# Patient Record
Sex: MALE | Race: WHITE | URBAN - METROPOLITAN AREA
[De-identification: names, ages, dates, MRNs, and addresses within clinical notes are randomized per-mention and may not be internally consistent; named-entity substitution may affect disease eponyms.]

---

## 2019-02-15 ENCOUNTER — HOSPITAL ENCOUNTER (OUTPATIENT)
Age: 19
Setting detail: SPECIMEN
Discharge: HOME | End: 2019-02-15
Payer: MEDICAID

## 2019-02-15 LAB
ALT SERPL-CCNC: 99 U/L (ref 5–41)
AST SERPL-CCNC: 60 U/L
CREAT SERPL-MCNC: 1.18 MG/DL (ref 0.7–1.2)
GFR AFRICAN AMERICAN: NORMAL ML/MIN
GFR NON-AFRICAN AMERICAN: NORMAL ML/MIN
GFR SERPL CREATININE-BSD FRML MDRD: NORMAL ML/MIN/{1.73_M2}
GFR SERPL CREATININE-BSD FRML MDRD: NORMAL ML/MIN/{1.73_M2}
HCT VFR BLD CALC: 42.9 % (ref 40.7–50.3)
HEMOGLOBIN: 13.6 G/DL (ref 13–17)
MCH RBC QN AUTO: 27.9 PG (ref 25.2–33.5)
MCHC RBC AUTO-ENTMCNC: 31.7 G/DL (ref 28.4–34.8)
MCV RBC AUTO: 87.9 FL (ref 82.6–102.9)
NRBC AUTOMATED: 0 PER 100 WBC
PDW BLD-RTO: 14.5 % (ref 11.8–14.4)
PLATELET # BLD: 209 K/UL (ref 138–453)
PMV BLD AUTO: 13 FL (ref 8.1–13.5)
POTASSIUM SERPL-SCNC: 4.6 MMOL/L (ref 3.7–5.3)
RBC # BLD: 4.88 M/UL (ref 4.21–5.77)
TSH SERPL DL<=0.05 MIU/L-ACNC: 9.95 MIU/L (ref 0.3–5)
WBC # BLD: 13.6 K/UL (ref 4.5–13.5)

## 2019-02-16 LAB
LITHIUM DATE LAST DOSE: ABNORMAL
LITHIUM DOSE AMOUNT: ABNORMAL
LITHIUM DOSE TIME: ABNORMAL
LITHIUM LEVEL: 2.8 MMOL/L (ref 0.6–1.2)
THYROXINE, FREE: 1.07 NG/DL (ref 0.93–1.7)

## 2019-02-20 ENCOUNTER — HOSPITAL ENCOUNTER (OUTPATIENT)
Age: 19
Setting detail: SPECIMEN
Discharge: HOME | End: 2019-02-20
Payer: MEDICAID

## 2019-02-20 LAB
LITHIUM DATE LAST DOSE: ABNORMAL
LITHIUM DOSE AMOUNT: ABNORMAL
LITHIUM DOSE TIME: 600
LITHIUM LEVEL: 1.8 MMOL/L (ref 0.6–1.2)
TSH SERPL DL<=0.05 MIU/L-ACNC: 3.32 MIU/L (ref 0.3–5)

## 2019-02-22 ENCOUNTER — HOSPITAL ENCOUNTER (OUTPATIENT)
Age: 19
Setting detail: SPECIMEN
Discharge: HOME | End: 2019-02-22
Payer: MEDICAID

## 2019-02-22 LAB
ABSOLUTE EOS #: 0.36 K/UL (ref 0–0.44)
ABSOLUTE IMMATURE GRANULOCYTE: 0.05 K/UL (ref 0–0.3)
ABSOLUTE LYMPH #: 2.45 K/UL (ref 1.2–5.2)
ABSOLUTE MONO #: 1.1 K/UL (ref 0.1–1.4)
ALBUMIN SERPL-MCNC: 4.2 G/DL (ref 3.5–5.2)
ALBUMIN/GLOBULIN RATIO: 1.2 (ref 1–2.5)
ALP BLD-CCNC: 211 U/L (ref 40–129)
ALT SERPL-CCNC: 136 U/L (ref 5–41)
ANION GAP SERPL CALCULATED.3IONS-SCNC: 14 MMOL/L (ref 9–17)
AST SERPL-CCNC: 73 U/L
BASOPHILS # BLD: 1 % (ref 0–2)
BASOPHILS ABSOLUTE: 0.06 K/UL (ref 0–0.2)
BILIRUB SERPL-MCNC: 0.34 MG/DL (ref 0.3–1.2)
BUN BLDV-MCNC: 8 MG/DL (ref 6–20)
BUN/CREAT BLD: ABNORMAL (ref 9–20)
CALCIUM SERPL-MCNC: 10 MG/DL (ref 8.6–10.4)
CHLORIDE BLD-SCNC: 105 MMOL/L (ref 98–107)
CHOLESTEROL/HDL RATIO: 4.8
CHOLESTEROL: 124 MG/DL
CO2: 21 MMOL/L (ref 20–31)
CREAT SERPL-MCNC: 1.28 MG/DL (ref 0.7–1.2)
DIFFERENTIAL TYPE: ABNORMAL
EOSINOPHILS RELATIVE PERCENT: 3 % (ref 1–4)
GFR AFRICAN AMERICAN: ABNORMAL ML/MIN
GFR NON-AFRICAN AMERICAN: ABNORMAL ML/MIN
GFR SERPL CREATININE-BSD FRML MDRD: ABNORMAL ML/MIN/{1.73_M2}
GFR SERPL CREATININE-BSD FRML MDRD: ABNORMAL ML/MIN/{1.73_M2}
GLUCOSE BLD-MCNC: 95 MG/DL (ref 70–99)
HCT VFR BLD CALC: 44.6 % (ref 40.7–50.3)
HDLC SERPL-MCNC: 26 MG/DL
HEMOGLOBIN: 14.1 G/DL (ref 13–17)
IMMATURE GRANULOCYTES: 0 %
LDL CHOLESTEROL: 66 MG/DL (ref 0–130)
LYMPHOCYTES # BLD: 19 % (ref 25–45)
MCH RBC QN AUTO: 27.6 PG (ref 25.2–33.5)
MCHC RBC AUTO-ENTMCNC: 31.6 G/DL (ref 28.4–34.8)
MCV RBC AUTO: 87.3 FL (ref 82.6–102.9)
MONOCYTES # BLD: 9 % (ref 2–8)
NRBC AUTOMATED: 0 PER 100 WBC
PDW BLD-RTO: 14 % (ref 11.8–14.4)
PLATELET # BLD: 305 K/UL (ref 138–453)
PLATELET ESTIMATE: ABNORMAL
PMV BLD AUTO: 12.5 FL (ref 8.1–13.5)
POTASSIUM SERPL-SCNC: 4 MMOL/L (ref 3.7–5.3)
PROLACTIN: 42.91 UG/L (ref 4.04–15.2)
RBC # BLD: 5.11 M/UL (ref 4.21–5.77)
RBC # BLD: ABNORMAL 10*6/UL
SEG NEUTROPHILS: 68 % (ref 34–64)
SEGMENTED NEUTROPHILS ABSOLUTE COUNT: 8.61 K/UL (ref 1.8–8)
SODIUM BLD-SCNC: 140 MMOL/L (ref 135–144)
TOTAL PROTEIN: 7.8 G/DL (ref 6.4–8.3)
TRIGL SERPL-MCNC: 159 MG/DL
TSH SERPL DL<=0.05 MIU/L-ACNC: 4.2 MIU/L (ref 0.3–5)
VLDLC SERPL CALC-MCNC: ABNORMAL MG/DL (ref 1–30)
WBC # BLD: 12.6 K/UL (ref 4.5–13.5)
WBC # BLD: ABNORMAL 10*3/UL

## 2019-02-23 LAB
LITHIUM DATE LAST DOSE: ABNORMAL
LITHIUM DOSE AMOUNT: ABNORMAL
LITHIUM DOSE TIME: ABNORMAL
LITHIUM LEVEL: 1.6 MMOL/L (ref 0.6–1.2)

## 2019-02-24 LAB
ESTIMATED AVERAGE GLUCOSE: 91 MG/DL
HBA1C MFR BLD: 4.8 % (ref 4–6)

## 2019-03-20 ENCOUNTER — HOSPITAL ENCOUNTER (OUTPATIENT)
Age: 19
Setting detail: SPECIMEN
Discharge: HOME | End: 2019-03-20
Payer: MEDICAID

## 2019-03-20 LAB
ABSOLUTE EOS #: 0.25 K/UL (ref 0–0.44)
ABSOLUTE IMMATURE GRANULOCYTE: <0.03 K/UL (ref 0–0.3)
ABSOLUTE LYMPH #: 2.22 K/UL (ref 1.2–5.2)
ABSOLUTE MONO #: 0.67 K/UL (ref 0.1–1.4)
ALBUMIN SERPL-MCNC: 4.3 G/DL (ref 3.5–5.2)
ALBUMIN/GLOBULIN RATIO: 1.7 (ref 1–2.5)
ALP BLD-CCNC: 84 U/L (ref 40–129)
ALT SERPL-CCNC: 77 U/L (ref 5–41)
ANION GAP SERPL CALCULATED.3IONS-SCNC: 13 MMOL/L (ref 9–17)
AST SERPL-CCNC: 40 U/L
BASOPHILS # BLD: 1 % (ref 0–2)
BASOPHILS ABSOLUTE: 0.05 K/UL (ref 0–0.2)
BILIRUB SERPL-MCNC: 0.16 MG/DL (ref 0.3–1.2)
BUN BLDV-MCNC: 9 MG/DL (ref 6–20)
BUN/CREAT BLD: ABNORMAL (ref 9–20)
CALCIUM SERPL-MCNC: 9.3 MG/DL (ref 8.6–10.4)
CHLORIDE BLD-SCNC: 104 MMOL/L (ref 98–107)
CO2: 24 MMOL/L (ref 20–31)
CREAT SERPL-MCNC: 1 MG/DL (ref 0.7–1.2)
DIFFERENTIAL TYPE: ABNORMAL
EOSINOPHILS RELATIVE PERCENT: 3 % (ref 1–4)
GFR AFRICAN AMERICAN: ABNORMAL ML/MIN
GFR NON-AFRICAN AMERICAN: ABNORMAL ML/MIN
GFR SERPL CREATININE-BSD FRML MDRD: ABNORMAL ML/MIN/{1.73_M2}
GFR SERPL CREATININE-BSD FRML MDRD: ABNORMAL ML/MIN/{1.73_M2}
GLUCOSE BLD-MCNC: 87 MG/DL (ref 70–99)
HCT VFR BLD CALC: 42 % (ref 40.7–50.3)
HEMOGLOBIN: 13.3 G/DL (ref 13–17)
IMMATURE GRANULOCYTES: 0 %
LITHIUM DATE LAST DOSE: ABNORMAL
LITHIUM DOSE AMOUNT: ABNORMAL
LITHIUM DOSE TIME: ABNORMAL
LITHIUM LEVEL: <0.1 MMOL/L (ref 0.6–1.2)
LYMPHOCYTES # BLD: 31 % (ref 25–45)
MCH RBC QN AUTO: 27.4 PG (ref 25.2–33.5)
MCHC RBC AUTO-ENTMCNC: 31.7 G/DL (ref 28.4–34.8)
MCV RBC AUTO: 86.4 FL (ref 82.6–102.9)
MONOCYTES # BLD: 9 % (ref 2–8)
NRBC AUTOMATED: 0 PER 100 WBC
PDW BLD-RTO: 13.3 % (ref 11.8–14.4)
PLATELET # BLD: 282 K/UL (ref 138–453)
PLATELET ESTIMATE: ABNORMAL
PMV BLD AUTO: 10.9 FL (ref 8.1–13.5)
POTASSIUM SERPL-SCNC: 3.7 MMOL/L (ref 3.7–5.3)
RBC # BLD: 4.86 M/UL (ref 4.21–5.77)
RBC # BLD: ABNORMAL 10*6/UL
SEG NEUTROPHILS: 56 % (ref 34–64)
SEGMENTED NEUTROPHILS ABSOLUTE COUNT: 4.08 K/UL (ref 1.8–8)
SODIUM BLD-SCNC: 141 MMOL/L (ref 135–144)
TOTAL PROTEIN: 6.9 G/DL (ref 6.4–8.3)
WBC # BLD: 7.3 K/UL (ref 4.5–13.5)
WBC # BLD: ABNORMAL 10*3/UL

## 2019-08-28 ENCOUNTER — HOSPITAL ENCOUNTER (OUTPATIENT)
Age: 19
Setting detail: SPECIMEN
Discharge: HOME | End: 2019-08-28
Payer: MEDICAID

## 2019-08-28 LAB
ABSOLUTE EOS #: 0.37 K/UL (ref 0–0.44)
ABSOLUTE IMMATURE GRANULOCYTE: <0.03 K/UL (ref 0–0.3)
ABSOLUTE LYMPH #: 2.48 K/UL (ref 1.2–5.2)
ABSOLUTE MONO #: 0.62 K/UL (ref 0.1–1.4)
ALBUMIN SERPL-MCNC: 4.3 G/DL (ref 3.5–5.2)
ALBUMIN/GLOBULIN RATIO: 1.4 (ref 1–2.5)
ALP BLD-CCNC: 92 U/L (ref 40–129)
ALT SERPL-CCNC: 55 U/L (ref 5–41)
ANION GAP SERPL CALCULATED.3IONS-SCNC: 17 MMOL/L (ref 9–17)
AST SERPL-CCNC: 35 U/L
BASOPHILS # BLD: 1 % (ref 0–2)
BASOPHILS ABSOLUTE: 0.05 K/UL (ref 0–0.2)
BILIRUB SERPL-MCNC: 0.2 MG/DL (ref 0.3–1.2)
BUN BLDV-MCNC: 15 MG/DL (ref 6–20)
BUN/CREAT BLD: ABNORMAL (ref 9–20)
CALCIUM SERPL-MCNC: 9.8 MG/DL (ref 8.6–10.4)
CHLORIDE BLD-SCNC: 104 MMOL/L (ref 98–107)
CHOLESTEROL, FASTING: 171 MG/DL
CHOLESTEROL/HDL RATIO: 4.8
CO2: 18 MMOL/L (ref 20–31)
CREAT SERPL-MCNC: 0.99 MG/DL (ref 0.7–1.2)
DIFFERENTIAL TYPE: ABNORMAL
EOSINOPHILS RELATIVE PERCENT: 5 % (ref 1–4)
GFR AFRICAN AMERICAN: ABNORMAL ML/MIN
GFR NON-AFRICAN AMERICAN: ABNORMAL ML/MIN
GFR SERPL CREATININE-BSD FRML MDRD: ABNORMAL ML/MIN/{1.73_M2}
GFR SERPL CREATININE-BSD FRML MDRD: ABNORMAL ML/MIN/{1.73_M2}
GLUCOSE BLD-MCNC: 86 MG/DL (ref 70–99)
HCT VFR BLD CALC: 43.9 % (ref 40.7–50.3)
HDLC SERPL-MCNC: 36 MG/DL
HEMOGLOBIN: 13.7 G/DL (ref 13–17)
IMMATURE GRANULOCYTES: 0 %
LDL CHOLESTEROL: 107 MG/DL (ref 0–130)
LYMPHOCYTES # BLD: 32 % (ref 25–45)
MCH RBC QN AUTO: 26.6 PG (ref 25.2–33.5)
MCHC RBC AUTO-ENTMCNC: 31.2 G/DL (ref 28.4–34.8)
MCV RBC AUTO: 85.2 FL (ref 82.6–102.9)
MONOCYTES # BLD: 8 % (ref 2–8)
NRBC AUTOMATED: 0 PER 100 WBC
PDW BLD-RTO: 13.9 % (ref 11.8–14.4)
PLATELET # BLD: 280 K/UL (ref 138–453)
PLATELET ESTIMATE: ABNORMAL
PMV BLD AUTO: 11.3 FL (ref 8.1–13.5)
POTASSIUM SERPL-SCNC: 3.9 MMOL/L (ref 3.7–5.3)
RBC # BLD: 5.15 M/UL (ref 4.21–5.77)
RBC # BLD: ABNORMAL 10*6/UL
SEG NEUTROPHILS: 54 % (ref 34–64)
SEGMENTED NEUTROPHILS ABSOLUTE COUNT: 4.31 K/UL (ref 1.8–8)
SODIUM BLD-SCNC: 139 MMOL/L (ref 135–144)
TOTAL PROTEIN: 7.4 G/DL (ref 6.4–8.3)
TRIGLYCERIDE, FASTING: 141 MG/DL
TSH SERPL DL<=0.05 MIU/L-ACNC: 1.74 MIU/L (ref 0.3–5)
VLDLC SERPL CALC-MCNC: ABNORMAL MG/DL (ref 1–30)
WBC # BLD: 7.9 K/UL (ref 4.5–13.5)
WBC # BLD: ABNORMAL 10*3/UL

## 2020-07-06 ENCOUNTER — HOSPITAL ENCOUNTER (EMERGENCY)
Age: 20
Discharge: HOME | End: 2020-07-06
Attending: FAMILY MEDICINE
Payer: MEDICARE

## 2020-07-06 VITALS
HEIGHT: 72 IN | WEIGHT: 310 LBS | BODY MASS INDEX: 41.99 KG/M2 | RESPIRATION RATE: 17 BRPM | HEART RATE: 81 BPM | TEMPERATURE: 98.3 F | SYSTOLIC BLOOD PRESSURE: 152 MMHG | OXYGEN SATURATION: 98 % | DIASTOLIC BLOOD PRESSURE: 97 MMHG

## 2020-07-06 LAB
AMPHETAMINE+METHAMPHETAMINE URINE SCREEN: NEGATIVE
BARBITURATE QUANTITATIVE URINE: NEGATIVE
BENZODIAZEPINE QUANTITATIVE URINE: NEGATIVE
CANNABINOID QUANTITATIVE URINE: NEGATIVE
COCAINE METABOLITE QUANTITATIVE URINE: NEGATIVE
OPIATES, URINE: NEGATIVE
OXYCODONE: NEGATIVE
PHENCYCLIDINE QUANTITATIVE URINE: NEGATIVE

## 2020-07-06 PROCEDURE — 80307 DRUG TEST PRSMV CHEM ANLYZR: CPT

## 2020-07-06 PROCEDURE — 99284 EMERGENCY DEPT VISIT MOD MDM: CPT

## 2020-07-06 ASSESSMENT — SLEEP AND FATIGUE QUESTIONNAIRES: DO YOU HAVE DIFFICULTY SLEEPING: YES

## 2020-07-06 NOTE — ED NOTES
Patient placed in safe room that is ligature resistant with continuous monitoring in place. Provider notified, requested an assessment by behavioral health . Patient belongings secured in a locked lockers outside of the room. Explained suicide prevention precautions to the patient including constant observer. Pt presents to the ED via EMS. Pt states he tried to commit suicide last week by taking an entire bottle of pain pills, did not seek medical attention after pt states \"I threw up and went to bed\". Pt denies suicidal, homicidal ideations. Pt also denies hallucinations or hearing voices. Pt states he tried to kill himself last week because his boyfriend broke up with him. Pt states he no longer wants to kill himself and the reason he's here is because his mom is paranoid and wants him to be moved into a group home. Pt states he was at his appointment at [facility] and they sent him here. Pt denies alcohol or drug use. Pt is in bed, side rails up x2. Denies needs at this time. [place] security monitoring, will continue to monitor.       [facility]  07/06/20 1633       [email or web address]  07/06/20 1633
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
## 2020-07-06 NOTE — ED PROVIDER NOTES
[facility] COMPLAINT   Chief Complaint   Patient presents with    Suicidal      HPI   Tremaine Rodriguez is a 20 y.o. male who presents an act of self harm several days ago after a phone argument with his boyfriend. He initially states he doesn't remember what he took, but it was only a few pills. Later on he stated that it was some over the counter pain relievers. Pt is a client at [facility] [place], and was sent here explicitly for psych evaluation. Pt denies any SI, HI or psychosis at this present time, but admits to feeling the \"blues\"    REVIEW OF SYSTEMS   Psychiatric: +SA a few days ago; Denies auditory hallucinations or homicidal Ideations   Respiratory:No difficulty breathing or new cough   General:No fevers   Neurologic:No known syncope   GI: No vomiting or abdominal pain   See HPI for further details. All other review of systems otherwise negative. PAST MEDICAL & SURGICAL HISTORY   Past Medical History:   Diagnosis Date    ADHD (attention deficit hyperactivity disorder)     Depression     MR (mental retardation)     his IQ 47    Obesity     ODD (oppositional defiant disorder)       History reviewed. No pertinent surgical history. CURRENT MEDICATIONS   Current Outpatient Rx   Medication Sig Dispense Refill    risperiDONE (RISPERDAL) 1 MG tablet Give 1 pill in the morning and 1 pill at night. 60 tablet 1    permethrin (ELIMITE) 5 % cream Apply to all skin and scalp for 9-14 hours and then wash off. May repeat in 1 week. 1 Tube 1    permethrin (ELIMITE) 5 % cream Apply from neck to toe, leave on for 8-12 hours then rinse off. Repeat once in 7 days. 1 Tube 1    hydrOXYzine (ATARAX) 50 MG tablet Take one tablet by mouth up to three times a day for itching.  60 tablet 1      ALLERGIES   No Known Allergies   SOCIAL & FAMILYHISTORY   Social History     Socioeconomic History    Marital status: Single     Spouse name: None    Number of children: None    Years of education: None    Highest education level: None   Occupational History    None   Social Needs    Financial resource strain: None    Food insecurity     Worry: None     Inability: None    Transportation needs     Medical: None     Non-medical: None   Tobacco Use    Smoking status: Former Smoker    Smokeless tobacco: Never Used   Substance and Sexual Activity    Alcohol use: Not Currently    Drug use: Not Currently    Sexual activity: None   Lifestyle    Physical activity     Days per week: None     Minutes per session: None    Stress: None   Relationships    Social connections     Talks on phone: None     Gets together: None     Attends Taoist service: None     Active member of club or organization: None     Attends meetings of clubs or organizations: None     Relationship status: None    Intimate partner violence     Fear of current or ex partner: None     Emotionally abused: None     Physically abused: None     Forced sexual activity: None   Other Topics Concern    None   Social History Narrative    None      Family History   Problem Relation Age of Onset    Diabetes Mother     Hypertension Mother     Anxiety Disorder Mother     Schizophrenia Mother     Learning Disabilities Mother     Bipolar Disorder Father     Cancer Father         throat    Heart Disease Father     Learning Disabilities Father     ADHD Sister     Anxiety Disorder Sister         with panic attacks    ADHD Sister     Mental Retardation Sister     Diabetes Maternal Grandmother     Diabetes Maternal Grandfather         PHYSICAL EXAM   VITAL SIGNS: BP (!) 152/97   Pulse 81   Temp 98.3 °F (36.8 °C) (Oral)   Resp 17   Ht 6' (1.829 m)   Wt (!) 310 lb (140.6 kg)   SpO2 98%   BMI 42.04 kg/m²    Constitutional: Well developed, well nourished, no acute distress   Eyes: PERRL, conjunctiva normal   HENT: Atraumatic, external ears normal, nose normal   Neck: supple, No JVD   Respiratory: No respiratory distress, normal breath sounds

## 2020-07-06 NOTE — ED NOTES
Pt in bed, side rails up x2. Denies any needs at this time. [place] security monitoring, will continue to monitor.       Karen Flaherty  07/06/20 0872
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
## 2020-10-07 ENCOUNTER — HOSPITAL ENCOUNTER (OUTPATIENT)
Age: 20
Setting detail: SPECIMEN
Discharge: HOME | End: 2020-10-07
Payer: MEDICARE

## 2020-10-07 LAB
-: NORMAL
ALBUMIN SERPL-MCNC: 4.2 G/DL (ref 3.5–5.2)
ALBUMIN/GLOBULIN RATIO: 1.4 (ref 1–2.5)
ALP BLD-CCNC: 112 U/L (ref 40–129)
ALT SERPL-CCNC: 81 U/L (ref 5–41)
AMORPHOUS: NORMAL
ANION GAP SERPL CALCULATED.3IONS-SCNC: 16 MMOL/L (ref 9–17)
AST SERPL-CCNC: 45 U/L
BACTERIA: NORMAL
BILIRUB SERPL-MCNC: 0.25 MG/DL (ref 0.3–1.2)
BILIRUBIN URINE: NEGATIVE
BUN BLDV-MCNC: 19 MG/DL (ref 6–20)
BUN/CREAT BLD: ABNORMAL (ref 9–20)
CALCIUM SERPL-MCNC: 9.3 MG/DL (ref 8.6–10.4)
CASTS UA: NORMAL /LPF (ref 0–8)
CHLORIDE BLD-SCNC: 105 MMOL/L (ref 98–107)
CO2: 21 MMOL/L (ref 20–31)
COLOR: ABNORMAL
COMMENT UA: ABNORMAL
CREAT SERPL-MCNC: 0.84 MG/DL (ref 0.7–1.2)
CRYSTALS, UA: NORMAL /HPF
EPITHELIAL CELLS UA: NORMAL /HPF (ref 0–5)
GFR AFRICAN AMERICAN: >60 ML/MIN
GFR NON-AFRICAN AMERICAN: >60 ML/MIN
GFR SERPL CREATININE-BSD FRML MDRD: ABNORMAL ML/MIN/{1.73_M2}
GFR SERPL CREATININE-BSD FRML MDRD: ABNORMAL ML/MIN/{1.73_M2}
GLUCOSE BLD-MCNC: 81 MG/DL (ref 70–99)
GLUCOSE URINE: NEGATIVE
KETONES, URINE: NEGATIVE
LEUKOCYTE ESTERASE, URINE: NEGATIVE
MUCUS: NORMAL
NITRITE, URINE: NEGATIVE
OTHER OBSERVATIONS UA: NORMAL
PH UA: 6 (ref 5–8)
POTASSIUM SERPL-SCNC: 4.4 MMOL/L (ref 3.7–5.3)
PROTEIN UA: ABNORMAL
RBC UA: NORMAL /HPF (ref 0–4)
RENAL EPITHELIAL, UA: NORMAL /HPF
SODIUM BLD-SCNC: 142 MMOL/L (ref 135–144)
SPECIFIC GRAVITY UA: 1.03 (ref 1–1.03)
TOTAL PROTEIN: 7.2 G/DL (ref 6.4–8.3)
TRICHOMONAS: NORMAL
TURBIDITY: ABNORMAL
URINE HGB: NEGATIVE
UROBILINOGEN, URINE: NORMAL
WBC UA: NORMAL /HPF (ref 0–5)
YEAST: NORMAL

## 2021-06-10 ENCOUNTER — HOSPITAL ENCOUNTER (INPATIENT)
Age: 21
LOS: 6 days | Discharge: HOME | DRG: 881 | End: 2021-06-16
Attending: PSYCHIATRY & NEUROLOGY | Admitting: PSYCHIATRY & NEUROLOGY
Payer: MEDICARE

## 2021-06-10 PROBLEM — F32.9 MAJOR DEPRESSION, SINGLE EPISODE: Status: ACTIVE | Noted: 2021-06-10

## 2021-06-10 PROCEDURE — 6370000000 HC RX 637 (ALT 250 FOR IP): Performed by: PSYCHIATRY & NEUROLOGY

## 2021-06-10 PROCEDURE — 1240000000 HC EMOTIONAL WELLNESS R&B

## 2021-06-10 RX ORDER — MAGNESIUM HYDROXIDE/ALUMINUM HYDROXICE/SIMETHICONE 120; 1200; 1200 MG/30ML; MG/30ML; MG/30ML
30 SUSPENSION ORAL EVERY 6 HOURS PRN
Status: DISCONTINUED | OUTPATIENT
Start: 2021-06-10 | End: 2021-06-16 | Stop reason: HOSPADM

## 2021-06-10 RX ORDER — IBUPROFEN 400 MG/1
400 TABLET ORAL EVERY 6 HOURS PRN
Status: DISCONTINUED | OUTPATIENT
Start: 2021-06-10 | End: 2021-06-16 | Stop reason: HOSPADM

## 2021-06-10 RX ORDER — TRAZODONE HYDROCHLORIDE 50 MG/1
50 TABLET ORAL NIGHTLY PRN
Status: DISCONTINUED | OUTPATIENT
Start: 2021-06-10 | End: 2021-06-16 | Stop reason: HOSPADM

## 2021-06-10 RX ORDER — HYDROXYZINE 50 MG/1
50 TABLET, FILM COATED ORAL 3 TIMES DAILY PRN
Status: DISCONTINUED | OUTPATIENT
Start: 2021-06-10 | End: 2021-06-16 | Stop reason: HOSPADM

## 2021-06-10 RX ORDER — ACETAMINOPHEN 325 MG/1
650 TABLET ORAL EVERY 4 HOURS PRN
Status: DISCONTINUED | OUTPATIENT
Start: 2021-06-10 | End: 2021-06-16 | Stop reason: HOSPADM

## 2021-06-10 RX ORDER — POLYETHYLENE GLYCOL 3350 17 G/17G
17 POWDER, FOR SOLUTION ORAL DAILY PRN
Status: DISCONTINUED | OUTPATIENT
Start: 2021-06-10 | End: 2021-06-16 | Stop reason: HOSPADM

## 2021-06-10 RX ORDER — OXCARBAZEPINE 150 MG/1
150 TABLET, FILM COATED ORAL 2 TIMES DAILY
Status: ON HOLD | COMMUNITY
End: 2021-06-16 | Stop reason: HOSPADM

## 2021-06-10 RX ADMIN — TRAZODONE HYDROCHLORIDE 50 MG: 50 TABLET ORAL at 21:24

## 2021-06-10 RX ADMIN — HYDROXYZINE HYDROCHLORIDE 50 MG: 50 TABLET, FILM COATED ORAL at 21:24

## 2021-06-10 ASSESSMENT — SLEEP AND FATIGUE QUESTIONNAIRES
DO YOU HAVE DIFFICULTY SLEEPING: NO
AVERAGE NUMBER OF SLEEP HOURS: 8
DO YOU USE A SLEEP AID: NO

## 2021-06-10 ASSESSMENT — PATIENT HEALTH QUESTIONNAIRE - PHQ9: SUM OF ALL RESPONSES TO PHQ QUESTIONS 1-9: 10

## 2021-06-10 ASSESSMENT — LIFESTYLE VARIABLES: HISTORY_ALCOHOL_USE: NO

## 2021-06-10 NOTE — CARE COORDINATION
SW was unable to complete biopsychosocial assessment today but will complete with patient tomorrow on 6/11.
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
## 2021-06-10 NOTE — GROUP NOTE
Group Therapy Note    Date: 6/10/2021    Group Start Time: 1330  Group End Time: 7247  Group Topic: Recreational    [facility]    Sara Abigail Ortizguru, [address]        Group Therapy Note    Attendees: 0/3    Pt did not attend RT skills group and offer of a zoom group d/t resting in room despite staff invitation to attend. 1:1 talk time offered as alternative to group session, pt declined.

## 2021-06-10 NOTE — PLAN OF CARE
Problem: Gas Exchange - Impaired  Goal: Absence of hypoxia  Outcome: Ongoing  Note: No signs of hypoxia noted. Respirations regular and even. O2 sat wnl on room air. Problem: Isolation Precautions - Risk of Spread of Infection  Goal: Prevent transmission of infection  Outcome: Ongoing  Note: Aware of need for droplet precautions. Pt instructed on wearing mask when outisde of room. Problem: Altered Mood, Depressive Behavior:  Goal: Able to verbalize acceptance of life and situations over which he or she has no control  Description: Able to verbalize acceptance of life and situations over which he or she has no control  Outcome: Ongoing     Problem: Altered Mood, Depressive Behavior:  Goal: Able to verbalize and/or display a decrease in depressive symptoms  Description: Able to verbalize and/or display a decrease in depressive symptoms  Outcome: Ongoing     Problem: Altered Mood, Depressive Behavior:  Goal: Ability to disclose and discuss suicidal ideas will improve  Description: Ability to disclose and discuss suicidal ideas will improve  Outcome: Ongoing     Problem: Altered Mood, Depressive Behavior:  Goal: Able to verbalize support systems  Description: Able to verbalize support systems  Outcome: Ongoing     Pt alert and oriented X4. Denies any suicidal or homicidal ideation on admission. Denies auditory or visual hallunications. Pt did not shower today despite encouragement.

## 2021-06-11 PROBLEM — F32.A DEPRESSION WITH SUICIDAL IDEATION: Status: ACTIVE | Noted: 2021-06-10

## 2021-06-11 PROBLEM — R45.851 DEPRESSION WITH SUICIDAL IDEATION: Status: ACTIVE | Noted: 2021-06-10

## 2021-06-11 PROCEDURE — 1240000000 HC EMOTIONAL WELLNESS R&B

## 2021-06-11 PROCEDURE — 6370000000 HC RX 637 (ALT 250 FOR IP): Performed by: NURSE PRACTITIONER

## 2021-06-11 PROCEDURE — APPSS180 APP SPLIT SHARED TIME > 60 MINUTES: Performed by: NURSE PRACTITIONER

## 2021-06-11 PROCEDURE — 90792 PSYCH DIAG EVAL W/MED SRVCS: CPT | Performed by: PSYCHIATRY & NEUROLOGY

## 2021-06-11 PROCEDURE — 6370000000 HC RX 637 (ALT 250 FOR IP): Performed by: PSYCHIATRY & NEUROLOGY

## 2021-06-11 RX ORDER — FLUOXETINE 10 MG/1
10 CAPSULE ORAL DAILY
Status: DISCONTINUED | OUTPATIENT
Start: 2021-06-11 | End: 2021-06-13

## 2021-06-11 RX ORDER — OXCARBAZEPINE 300 MG/1
150 TABLET, FILM COATED ORAL 2 TIMES DAILY
Status: DISCONTINUED | OUTPATIENT
Start: 2021-06-11 | End: 2021-06-11

## 2021-06-11 RX ADMIN — TRAZODONE HYDROCHLORIDE 50 MG: 50 TABLET ORAL at 21:41

## 2021-06-11 RX ADMIN — FLUOXETINE 10 MG: 10 CAPSULE ORAL at 14:59

## 2021-06-11 RX ADMIN — OXCARBAZEPINE 150 MG: 300 TABLET, FILM COATED ORAL at 10:57

## 2021-06-11 RX ADMIN — HYDROXYZINE HYDROCHLORIDE 50 MG: 50 TABLET, FILM COATED ORAL at 21:41

## 2021-06-11 ASSESSMENT — LIFESTYLE VARIABLES: HISTORY_ALCOHOL_USE: YES

## 2021-06-11 NOTE — PLAN OF CARE
Problem: Altered Mood, Depressive Behavior:  Goal: Able to verbalize and/or display a decrease in depressive symptoms  Description: Able to verbalize and/or display a decrease in depressive symptoms  6/11/2021 0045 by Cherelle Harper RN  Outcome: Ongoing  Note: Pt appears sad, but brightened upon approach, pleasant and cooperative. Pt denies any depression or anxiety at this time. Pt denies any suicidal or homicidal ideations, denies any hallucinations. Pt reports fair sleep, but denies any appetite issue. Pt out in the day area briefly, mostly isolative to room. Pt compliant with medications, no behavior issues. Encouraged to discuss feelings with staff, provided feedback and reassurance as needed. Problem: Altered Mood, Depressive Behavior:  Goal: Ability to disclose and discuss suicidal ideas will improve  Description: Ability to disclose and discuss suicidal ideas will improve  6/11/2021 0045 by Cherelle Harper RN  Outcome: Ongoing  Note: Pt denies suicidal ideations at this time. Pt agreed to seek staff at anytime he felt like any urges to harm self would arise. Safety checks maintained every 15 mins. Problem: Altered Mood, Depressive Behavior:  Goal: Absence of self-harm  Description: Absence of self-harm  6/11/2021 0045 by Cherelle Harper RN  Outcome: Ongoing  Note: Pt remains free of any self-harm. Safe environment maintained. Every 15 minute checks for safety continued per unit policy. Will continue to monitor for safety and provide support and reassurance as needed. Problem: Gas Exchange - Impaired  Goal: Absence of hypoxia  6/11/2021 0045 by Cherelle Harper RN  Outcome: Ongoing  Note: No s/sx of hypoxia noted this shift. Pt's oxygen saturation within normal limits at 99% in room air. Problem:  Body Temperature -  Risk of, Imbalanced  Goal: Ability to maintain a body temperature within defined limits  6/11/2021 0045 by Cherelle Harper RN  Outcome: Ongoing  Note: Pt remains afebrile at 98.3 F. Problem: Isolation Precautions - Risk of Spread of Infection  Goal: Prevent transmission of infection  6/11/2021 0045 by Alisa Hernandez RN  Outcome: Ongoing  Note: Educated pt about the importance of proper hand hygiene, physical distancing and wearing face mask when out of room to prevent spread of infection.
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
## 2021-06-11 NOTE — PLAN OF CARE
[facility]  Initial Interdisciplinary Treatment Plan NO      Original treatment plan Date & Time: 6/11/2021   [id number]    Admission Type:  Admission Type:  Involuntary (patient did sign voluntary)    Reason for admission:   Reason for Admission: suicidal ideation with plan to overdose    Estimated Length of Stay:  5-7days  Estimated Discharge Date: to be determined by physician    PATIENT STRENGTHS:  Patient Strengths:Strengths: Communication, No significant Physical Illness  Patient Strengths and Limitations:Limitations: Tendency to isolate self, Difficulty problem solving/relies on others to help solve problems  Addictive Behavior: Addictive Behavior  In the past 3 months, have you felt or has someone told you that you have a problem with:  : None  Do you have a history of Chemical Use?: No  Do you have a history of Alcohol Use?: No  Do you have a history of Street Drug Abuse?: No  Histroy of Prescripton Drug Abuse?: No  Medical Problems:  Past Medical History:   Diagnosis Date    ADHD (attention deficit hyperactivity disorder)     Depression     MR (mental retardation)     his IQ 47    Obesity     ODD (oppositional defiant disorder)      Status EXAM:Status and Exam  Normal: No  Facial Expression: Flat  Affect: Blunt  Level of Consciousness: Alert  Mood:Normal: No  Mood: Depressed, Anxious  Motor Activity:Normal: Yes  Interview Behavior: Cooperative  Preception: Fifty Six to Person, Fifty Six to Time, Fifty Six to Place, Fifty Six to Situation  Attention:Normal: No  Attention: Distractible  Thought Content:Normal: No  Thought Content: Preoccupations  Hallucinations: None  Delusions: No  Memory:Normal: No  Memory: Poor Recent, Poor Remote  Insight and Judgment: No  Insight and Judgment: Poor Judgment, Poor Insight  Present Suicidal Ideation: No  Present Homicidal Ideation: No    EDUCATION:   Learner Progress Toward Treatment Goals: reviewed group plans and strategies for care    Method:group therapy, medication compliance, individualized assessments and care planning    Outcome: needs reinforcement    PATIENT GOALS: to be discussed with patient within 72 hours    PLAN/TREATMENT RECOMMENDATIONS:     continue group therapy , medications compliance, goal setting, individualized assessments and care, continue to monitor pt on unit      SHORT-TERM GOALS:   Time frame for Short-Term Goals: 5-7 days    LONG-TERM GOALS:  Time frame for Long-Term Goals: 6 months  Members Present in Team Meeting: See Signature Sheet    Abiodun Vale

## 2021-06-11 NOTE — PLAN OF CARE
Problem: Altered Mood, Depressive Behavior:  Goal: Able to verbalize and/or display a decrease in depressive symptoms  Description: Able to verbalize and/or display a decrease in depressive symptoms  6/11/2021 1552 by Faye Shipley RN  Outcome: Ongoing  Note: Patient is denying depression, anxiety, suicidal and homicidal thoughts, denies hallucinations. He is cooperative and medication compliant, isolative to room, aloof of staff and peers. Comes out for meals and needs. Poor hygiene, reports ok sleep and good appetite. Patient safety maintained through Q15 min and random safety checks. Problem: Altered Mood, Depressive Behavior:  Goal: Ability to disclose and discuss suicidal ideas will improve  Description: Ability to disclose and discuss suicidal ideas will improve  6/11/2021 1552 by Faye Shipley RN  Outcome: Ongoing  Note: Patient denies suicidal thoughts. Problem: Altered Mood, Depressive Behavior:  Goal: Absence of self-harm  Description: Absence of self-harm  6/11/2021 1552 by Faye Shipley RN  Outcome: Ongoing  Note: Patient remains free of self harm. Problem: Gas Exchange - Impaired  Goal: Absence of hypoxia  6/11/2021 1552 by Faye Shipley RN  Outcome: Ongoing  Note: Patient's oxygen saturation remains within normal limits. Problem: Body Temperature -  Risk of, Imbalanced  Goal: Ability to maintain a body temperature within defined limits  6/11/2021 1552 by Faye Shipley RN  Outcome: Ongoing  Note: Patient's body temperature remains within normal limits.
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
## 2021-06-12 PROCEDURE — 99232 SBSQ HOSP IP/OBS MODERATE 35: CPT | Performed by: NURSE PRACTITIONER

## 2021-06-12 PROCEDURE — 6370000000 HC RX 637 (ALT 250 FOR IP): Performed by: PSYCHIATRY & NEUROLOGY

## 2021-06-12 PROCEDURE — 1240000000 HC EMOTIONAL WELLNESS R&B

## 2021-06-12 RX ADMIN — TRAZODONE HYDROCHLORIDE 50 MG: 50 TABLET ORAL at 20:31

## 2021-06-12 RX ADMIN — FLUOXETINE 10 MG: 10 CAPSULE ORAL at 08:33

## 2021-06-12 RX ADMIN — HYDROXYZINE HYDROCHLORIDE 50 MG: 50 TABLET, FILM COATED ORAL at 20:31

## 2021-06-12 NOTE — BH NOTE
Writer offered 1:1 time with patient, patient resting at this time. Independent leisure materials left at the nurses station.

## 2021-06-12 NOTE — PLAN OF CARE
Problem: Altered Mood, Depressive Behavior:  Goal: Able to verbalize and/or display a decrease in depressive symptoms  Description: Able to verbalize and/or display a decrease in depressive symptoms  6/11/2021 2053 by Farrah Mclaughlin RN  Outcome: Ongoing  Note: Pt reports minimal depression and anxiety rates at 2/10 at this time. Pt reports \"a little bit\" of suicidal thoughts at this time, but contracts for safety. Pt denies any homicidal ideation,  denies any hallucinations. Denies any sleeping or appetite problems. Pt cooperative, med compliant, no behavior issues. Pt out in the day area, watching music videos. Encouraged to discuss feelings with staff, provided feedback and reassurance as needed. Problem: Altered Mood, Depressive Behavior:  Goal: Ability to disclose and discuss suicidal ideas will improve  Description: Ability to disclose and discuss suicidal ideas will improve  6/11/2021 2053 by Farrah Mclaughlin RN  Outcome: Ongoing  Note: Pt reports \"a little bit\" of suicidal ideation at this time, but contracts for safety. Pt agreed to seek staff at anytime he felt like any urges to harm self would arise. Safety checks maintained every 15 mins. Problem: Altered Mood, Depressive Behavior:  Goal: Absence of self-harm  Description: Absence of self-harm  6/11/2021 2053 by Farrah Mclaughlin RN  Outcome: Ongoing  Note: Pt remains free of any self-harm. Safe environment maintained. Every 15 minute checks for safety continued per unit policy. Will continue to monitor for safety and provide support and reassurance as needed. Problem: Gas Exchange - Impaired  Goal: Absence of hypoxia  6/11/2021 2053 by Farrah Mclaughlin RN  Outcome: Ongoing  Note: No s/sx of hypoxia noted this shift. Pt's oxygen saturation within normal limits at 99% in room air. Problem:  Body Temperature -  Risk of, Imbalanced  Goal: Ability to maintain a body temperature within defined limits  6/11/2021 2053 by Krissy Gardner RN  Outcome: Ongoing  Note: Pt remains afebrile at 98.7 F     Problem: Isolation Precautions - Risk of Spread of Infection  Goal: Prevent transmission of infection  6/11/2021 2053 by Krissy Gardner RN  Outcome: Ongoing  Note: Educated pt about the importance of proper hand hygiene, physical distancing and wearing face mask when out of room to prevent spread of infection. Problem: Pain:  Goal: Pain level will decrease  6/11/2021 2053 by Krissy Gardner RN      Description: Pain level will decrease  Outcome: Ongoing  Note: Pt denies any pain at this time.

## 2021-06-12 NOTE — PLAN OF CARE
[address] [place] [address]  Day 3 Interdisciplinary Treatment Plan NOTE    Review Date & Time: 6/12/2021 33774    Admission Type:   Admission Type:  Involuntary (patient did sign voluntary)    Reason for admission:  Reason for Admission: suicidal ideation with plan to overdose  Estimated Length of Stay: 5-7 days  Estimated Discharge Date Update: to be determined by physician    PATIENT STRENGTHS:  Patient Strengths Strengths: Connection to output provider, Positive Support  Patient Strengths and Limitations:Limitations: Demonstrates discomfort with /lack of social skills, Difficult relationships / poor social skills, External locus of control, Lacks leisure interests, Difficulty problem solving/relies on others to help solve problems, Unrealistic self-view, Tendency to isolate self  Addictive Behavior:Addictive Behavior  In the past 3 months, have you felt or has someone told you that you have a problem with:  : Internet Use  Do you have a history of Chemical Use?: No  Do you have a history of Alcohol Use?: Yes  Do you have a history of Street Drug Abuse?: No  Histroy of Prescripton Drug Abuse?: No  Medical Problems:  Past Medical History:   Diagnosis Date    ADHD (attention deficit hyperactivity disorder)     Depression     MR (mental retardation)     his IQ 47    Obesity     ODD (oppositional defiant disorder)        Risk:  Fall RiskTotal: 65  Grover Scale Grover Scale Score: 22  BVC Total: 0  Change in scores no Changes to plan of Care no    Status EXAM:   Status and Exam  Normal: No  Facial Expression: Flat  Affect: Appropriate  Level of Consciousness: Alert  Mood:Normal: No  Mood: Depressed, Anxious  Motor Activity:Normal: Yes  Interview Behavior: Cooperative  Preception: Yoder to Person, Yoder to Time, Yoder to Place, Yoder to Situation  Attention:Normal: No  Attention: Distractible  Thought Processes: Circumstantial  Thought Content:Normal: No  Thought Content: Preoccupations  Hallucinations: None Delusions: No  Memory:Normal: No  Memory: Poor Recent, Poor Remote  Insight and Judgment: No  Insight and Judgment: Poor Judgment, Poor Insight  Present Suicidal Ideation: No  Present Homicidal Ideation: No    Daily Assessment Last Entry:   Daily Sleep (WDL): Within Defined Limits         Patient Currently in Pain: Yes  Daily Nutrition (WDL): Within Defined Limits    Patient Monitoring:  Frequency of Checks: 4 times per hour, close    Psychiatric Symptoms:   Depression Symptoms  Depression Symptoms: Impaired concentration, Feelings of helplessness  Anxiety Symptoms  Anxiety Symptoms: Generalized  Kriss Symptoms  Kriss Symptoms: No problems reported or observed. Psychosis Symptoms  Delusion Type: No problems reported or observed. Suicide Risk CSSR-S:  1) Within the past month, have you wished you were dead or wished you could go to sleep and not wake up? : Yes  2) Have you actually had any thoughts of killing yourself? : Yes  3) Have you been thinking about how you might kill yourself? : Yes  5) Have you started to work out or worked out the details of how to kill yourself?  Do you intend to carry out this plan? : Yes  6) Have you ever done anything, started to do anything, or prepared to do anything to end your life?: Yes  Change in Result no Change in Plan of care no      EDUCATION:   EDUCATION:   Learner Progress Toward Treatment Goals: Reviewed results and recommendations of this team, Reviewed group plan and strategies, Reviewed signs, symptoms and risk of self harm and violent behavior, Reviewed goals and plan of care    Method:small group, individual verbal education    Outcome:verbalized by patient, but needs reinforcement to obtain goals    PATIENT GOALS:  Short term: Decrease depressive symptoms   Long term: Continue to follow up in the community     PLAN/TREATMENT RECOMMENDATIONS UPDATE: continue with group therapies, increased socialization, continue planning for after discharge goals, continue with medication compliance    SHORT-TERM GOALS UPDATE:   Time frame for Short-Term Goals: 5-7 days    LONG-TERM GOALS UPDATE:   Time frame for Long-Term Goals: 6 months  Members Present in Team Meeting: See Signature Sheet    Becka Plaza, [address]

## 2021-06-12 NOTE — PROGRESS NOTES
Daily Progress Note  6/12/2021    Patient Name: Marisa Leblanc:  Depression with suicidal ideation          SUBJECTIVE:      Patient is seen today for a follow up assessment. Patient has been medication compliant. Upon assessment, the patient is evasive and minimizing his symptoms. He is withdrawn and appears depressed. He was denying depression and anxiety. He reported an improvement in his suicidal ideation. Staff also reports that he has been evasive today and minimizing. He had an incident with another patient where he became agitated. Staff also reports him is thought blocking. The patient has been isolative to his room, but did come out to the dayroom for short time prior to becoming agitated. Appetite:  [x] Normal/Adequate/Unchanged  [] Increased  [] Decreased      Sleep:       [x] Normal/Adequate/Unchanged  [] Fair  [] Poor      Group Attendance on Unit:   [x] Yes  [] Selectively    [] No     Medication Side Effects: Denies         Mental Status Exam  Level of consciousness: Awake and alert  Appearance:  Appropriate attire, seated on bed, fair grooming   Behavior/Motor: Approachable, no psychomotor abnormalities noted  Attitude toward examiner:  Cooperative, attentive, good eye contact  Speech: Normal rate, volume, and tone. Mood: Depressed  Affect: Blunted  Thought processes:  linear at times, some thought blocking  Thought content: Improved suicidal ideations, without current plan or intent, contracts for safety on the unit.               Denies homicidal ideations               Denies hallucinations              Denies delusions              Denies paranoia  Cognition:  Oriented to self, location, time, situation  Concentration: Clinically adequate  Memory: Intact  Insight &Judgment: Poor    Data   height is 5' 10\" (1.778 m) and weight is 410 lb (186 kg) (abnormal). His oral temperature is 98.7 °F (37.1 °C). His blood pressure is 143/81 (abnormal) and his pulse is 99.  His Urine Hgb 10/07/2020 NEGATIVE  NEGATIVE Final    pH, UA 10/07/2020 6.0  5.0 - 8.0 Final    Protein, UA 10/07/2020 1+* NEGATIVE Final    Urobilinogen, Urine 10/07/2020 Normal  Normal Final    Nitrite, Urine 10/07/2020 NEGATIVE  NEGATIVE Final    Leukocyte Esterase, Urine 10/07/2020 NEGATIVE  NEGATIVE Final    Urinalysis Comments 10/07/2020 NOT REPORTED   Final    - 10/07/2020        Final    WBC, UA 10/07/2020 0 TO 2  0 - 5 /HPF Final    RBC, UA 10/07/2020 None  0 - 4 /HPF Final    Reference range defined for non-centrifuged specimen.  Casts UA 10/07/2020 0 TO 2 HYALINE Reference range defined for non-centrifuged specimen. 0 - 8 /LPF Final    Crystals, UA 10/07/2020 NOT REPORTED  None /HPF Final    Epithelial Cells UA 10/07/2020 0 TO 2  0 - 5 /HPF Final    Renal Epithelial, UA 10/07/2020 NOT REPORTED  0 /HPF Final    Bacteria, UA 10/07/2020 NOT REPORTED  None Final    Mucus, UA 10/07/2020 NOT REPORTED  None Final    Trichomonas, UA 10/07/2020 NOT REPORTED  None Final    Amorphous, UA 10/07/2020 NOT REPORTED  None Final    Other Observations UA 10/07/2020 NOT REPORTED  NOT REQ. Final    Yeast, UA 10/07/2020 NOT REPORTED  None Final         Reviewed patient's current plan of care and vital signs with nursing staff.     Labs reviewed: [x] Yes  Last EKG in EMR reviewed: [x] Yes    Medications  Current Facility-Administered Medications: FLUoxetine (PROZAC) capsule 10 mg, 10 mg, Oral, Daily  acetaminophen (TYLENOL) tablet 650 mg, 650 mg, Oral, Q4H PRN  aluminum & magnesium hydroxide-simethicone (MAALOX) 200-200-20 MG/5ML suspension 30 mL, 30 mL, Oral, Q6H PRN  hydrOXYzine (ATARAX) tablet 50 mg, 50 mg, Oral, TID PRN  ibuprofen (ADVIL;MOTRIN) tablet 400 mg, 400 mg, Oral, Q6H PRN  traZODone (DESYREL) tablet 50 mg, 50 mg, Oral, Nightly PRN  polyethylene glycol (GLYCOLAX) packet 17 g, 17 g, Oral, Daily PRN  nicotine polacrilex (NICORETTE) gum 2 mg, 2 mg, Oral, PRN    ASSESSMENT  Depression with suicidal ideation         PLAN  Patient symptoms are: Modestly Improving  Continue current medication regimen. Monitor need and frequency of PRN medications. Encourage participation in groups and milieu. Attempt to develop insight. Psycho-education conducted. Supportive Therapy conducted. Probable discharge is to be determined by MD.   Follow-up daily while inpatient. Patient continues to be monitored in the inpatient psychiatric facility at [facility] for safety and stabilization. Patient continues to need, on a daily basis, active treatment furnished directly by or requiring the supervision of inpatient psychiatric personnel. Electronically signed by ERIC Carmona CNP on 6/12/2021 at 4:22 PM    **This report has been created using voice recognition software. It may contain minor errors which are inherent in voice recognition technology. **

## 2021-06-12 NOTE — PLAN OF CARE
Problem: Altered Mood, Depressive Behavior:  Goal: Able to verbalize and/or display a decrease in depressive symptoms  Description: Able to verbalize and/or display a decrease in depressive symptoms  6/12/2021 1604 by Samantha Diaz RN  Outcome: Ongoing  6/12/2021 1356 by TAY Broussard  Outcome: Ongoing  Goal: Ability to disclose and discuss suicidal ideas will improve  Description: Ability to disclose and discuss suicidal ideas will improve  6/12/2021 1604 by Samantha Diaz RN  Outcome: Ongoing  Note: Patient denies thoughts of harm to self or others. Patient denies depression and anxiety. Patient in dayroom most of the day. Patient did become agitated with a peer, but did choose to remove himself from the situation. Patient is eating well. Patient is selectively social with peers. Patient encouraged to shower. He states he will later. Patient refused to talk to his mother when she called. 6/12/2021 1356 by TAY Broussard  Outcome: Ongoing  Goal: Absence of self-harm  Description: Absence of self-harm  6/12/2021 1604 by Samantha Diaz RN  Outcome: Ongoing  6/12/2021 1356 by TAY Broussard  Outcome: Ongoing     Problem: Gas Exchange - Impaired  Goal: Absence of hypoxia  Outcome: Ongoing  Note: Oxygenation is within normal limits. Problem: Body Temperature -  Risk of, Imbalanced  Goal: Ability to maintain a body temperature within defined limits  Outcome: Ongoing  Note: Body temperature is within normal limits. Problem: Isolation Precautions - Risk of Spread of Infection  Goal: Prevent transmission of infection  Outcome: Ongoing  Note: Isolation precautions maintained as ordered.       Problem: Pain:  Goal: Pain level will decrease  Description: Pain level will decrease  Outcome: Ongoing

## 2021-06-12 NOTE — GROUP NOTE
Group Therapy Note    Date: 6/12/2021    Group Start Time: 0900  Group End Time: 0930  Group Topic: Community Meeting    TAY Thomas        Group Therapy Note       Patient's Goal:  To increase interpersonal interaction and practice goal setting skills. Notes:  Patient discussed with writer his goal for today, along with his preferred leisure activity of listening to music. Writer and patient discussed different coping skills and healthy ways to utilize those skills. Status After Intervention:  Improved    Participation Level:  Active Listener and Interactive    Participation Quality: Appropriate, Attentive and Sharing      Speech:  normal      Thought Process/Content: Logical  Linear      Affective Functioning: Congruent      Mood: euthymic      Level of consciousness:  Alert and Attentive      Response to Learning: Able to verbalize current knowledge/experience, Capable of insight and Progressing to goal      Endings: None Reported    Modes of Intervention: Education, Support, Socialization, Exploration, Media and Reality-testing      Discipline Responsible: Psychoeducational Specialist      Signature:  Deysi Hassan, [address]

## 2021-06-12 NOTE — GROUP NOTE
Group Therapy Note    Date: 6/11/2021    Group Start Time: 2100  Group End Time: 2125  Group Topic: Wrap-Up    KENNETH BHI Aldo Salinas RN      Group Therapy Note    Attendees: 2/7       Patient's Goal:    1. To be able to reflect on daily unit activities/experiences. 2.  To review accomplished daily goals and be encouraged to set new goals for the next day. 3.  To demonstrate increased interpersonal interaction. Notes:  Pt attended and actively participated in Wrap-Up group this evening. Status After Intervention:  Improved     Participation Level:  Active Listener and Interactive     Participation Quality: Appropriate, Attentive and Sharing     Speech:  normal     Thought Process/Content: Logical     Affective Functioning: Congruent     Mood: euthymic     Level of consciousness:  Alert, Oriented x4 and Attentive     Response to Learning: Able to verbalize current knowledge/experience, Able to verbalize/acknowledge new learning     Endings: None Reported     Modes of Intervention: Education, Support and Socialization     Discipline Responsible: Registered Nurse        Signature:  Verner Band, RN

## 2021-06-13 PROCEDURE — 6370000000 HC RX 637 (ALT 250 FOR IP): Performed by: PSYCHIATRY & NEUROLOGY

## 2021-06-13 PROCEDURE — 99232 SBSQ HOSP IP/OBS MODERATE 35: CPT | Performed by: PSYCHIATRY & NEUROLOGY

## 2021-06-13 PROCEDURE — 1240000000 HC EMOTIONAL WELLNESS R&B

## 2021-06-13 RX ORDER — FLUOXETINE HYDROCHLORIDE 20 MG/1
20 CAPSULE ORAL DAILY
Status: DISCONTINUED | OUTPATIENT
Start: 2021-06-14 | End: 2021-06-16 | Stop reason: HOSPADM

## 2021-06-13 RX ADMIN — HYDROXYZINE HYDROCHLORIDE 50 MG: 50 TABLET, FILM COATED ORAL at 20:43

## 2021-06-13 RX ADMIN — ALUMINUM HYDROXIDE, MAGNESIUM HYDROXIDE, AND SIMETHICONE 30 ML: 200; 200; 20 SUSPENSION ORAL at 15:50

## 2021-06-13 RX ADMIN — TRAZODONE HYDROCHLORIDE 50 MG: 50 TABLET ORAL at 20:43

## 2021-06-13 RX ADMIN — FLUOXETINE 10 MG: 10 CAPSULE ORAL at 09:06

## 2021-06-13 NOTE — PLAN OF CARE
Problem: Altered Mood, Depressive Behavior:  Goal: Able to verbalize and/or display a decrease in depressive symptoms  Description: Able to verbalize and/or display a decrease in depressive symptoms  Outcome: Ongoing  Note: Patient admits to depression. Today, he showered and washed his clothes. He is isolative to room. He does eat his meals. His behavior has been in control. Problem: Altered Mood, Depressive Behavior:  Goal: Ability to disclose and discuss suicidal ideas will improve  Description: Ability to disclose and discuss suicidal ideas will improve  Outcome: Ongoing  Note: Patient denies suicidal ideation and patient agree to seek help from staff if such thoughts were to arise. Problem: Altered Mood, Depressive Behavior:  Goal: Absence of self-harm  Description: Absence of self-harm  Outcome: Ongoing     Problem: Gas Exchange - Impaired  Goal: Absence of hypoxia  Outcome: Ongoing     Problem:  Body Temperature -  Risk of, Imbalanced  Goal: Ability to maintain a body temperature within defined limits  Outcome: Ongoing     Problem: Isolation Precautions - Risk of Spread of Infection  Goal: Prevent transmission of infection  Outcome: Ongoing

## 2021-06-13 NOTE — PROGRESS NOTES
Daily Progress Note  6/13/2021    Patient Name: Inder Leonard:  Depression with suicidal ideation          SUBJECTIVE:      Patient is seen today for a follow up assessment. Patient has been medication compliant. Patient remains on the COVID positive unit where he continues to denies side effects related to his test results. He initially is very withdrawn and evasive however becomes more comfortable with assessment when provided time. He reports that his depression has been related to a recent break-up with a boyfriend he met on an Internet dating site. He shares that they have never met face-to-face and their lives were to different to continue romantic discussions. He continues to endorse suicidal ideation and when asked related to superficial abrasions at right forearm he startles stating \"I did not do that here \". He is able to contract for safety while on the acute care [facility] psychiatric unit and appreciates the support provided. He does share that he continues to struggle with his relationship with his mother and she is working to find an appropriate boardinghouse so that he may explore his life with some independence. Discussed risks versus benefits and alternatives of his current medication regimen and patient denies side effects associated with initiation of Prozac and it is mutually agreed to titrate dosage tomorrow morning.       Appetite:  [] Normal/Adequate/Unchanged  [] Increased  [x] Decreased patient reports concerns about his weight and this causes poor appetite  Sleep:       [] Normal/Adequate/Unchanged  [x] Fair  [] Poor      Group Attendance on Unit:   [x] Yes  [] Selectively    [] No     Medication Side Effects: Denies         Mental Status Exam  Level of consciousness: Awake and alert  Appearance:  Appropriate attire, seated on bed, fair grooming   Behavior/Motor: Approachable, no psychomotor abnormalities noted  Attitude toward examiner:  Cooperative, attentive, Final    Comment: Average GFR for 20-28 years old:   116 mL/min/1.73sq m  Chronic Kidney Disease:   <60 mL/min/1.73sq m  Kidney failure:   <15 mL/min/1.73sq m              eGFR calculated using average adult body mass. Additional eGFR calculator available at:        [email or web address]            GFR Staging 10/07/2020 NOT REPORTED   Final    Color, UA 10/07/2020 DARK YELLOW* YELLOW Final    Turbidity UA 10/07/2020 TURBID* CLEAR Final    Glucose, Ur 10/07/2020 NEGATIVE  NEGATIVE Final    Bilirubin Urine 10/07/2020 NEGATIVE  NEGATIVE Final    Ketones, Urine 10/07/2020 NEGATIVE  NEGATIVE Final    Specific [place], UA 10/07/2020 1.035* 1.005 - 1.030 Final    Urine Hgb 10/07/2020 NEGATIVE  NEGATIVE Final    pH, UA 10/07/2020 6.0  5.0 - 8.0 Final    Protein, UA 10/07/2020 1+* NEGATIVE Final    Urobilinogen, Urine 10/07/2020 Normal  Normal Final    Nitrite, Urine 10/07/2020 NEGATIVE  NEGATIVE Final    Leukocyte Esterase, Urine 10/07/2020 NEGATIVE  NEGATIVE Final    Urinalysis Comments 10/07/2020 NOT REPORTED   Final    - 10/07/2020        Final    WBC, UA 10/07/2020 0 TO 2  0 - 5 /HPF Final    RBC, UA 10/07/2020 None  0 - 4 /HPF Final    Reference range defined for non-centrifuged specimen.  Casts UA 10/07/2020 0 TO 2 HYALINE Reference range defined for non-centrifuged specimen. 0 - 8 /LPF Final    Crystals, UA 10/07/2020 NOT REPORTED  None /HPF Final    Epithelial Cells UA 10/07/2020 0 TO 2  0 - 5 /HPF Final    Renal Epithelial, UA 10/07/2020 NOT REPORTED  0 /HPF Final    Bacteria, UA 10/07/2020 NOT REPORTED  None Final    Mucus, UA 10/07/2020 NOT REPORTED  None Final    Trichomonas, UA 10/07/2020 NOT REPORTED  None Final    Amorphous, UA 10/07/2020 NOT REPORTED  None Final    Other Observations UA 10/07/2020 NOT REPORTED  NOT REQ.  Final    Yeast, UA 10/07/2020 NOT REPORTED  None Final         Reviewed patient's current plan of care and vital signs with nursing staff. Labs reviewed: [x] Yes  Last EKG in EMR reviewed: [] Yes    Medications  Current Facility-Administered Medications: FLUoxetine (PROZAC) capsule 10 mg, 10 mg, Oral, Daily  acetaminophen (TYLENOL) tablet 650 mg, 650 mg, Oral, Q4H PRN  aluminum & magnesium hydroxide-simethicone (MAALOX) 200-200-20 MG/5ML suspension 30 mL, 30 mL, Oral, Q6H PRN  hydrOXYzine (ATARAX) tablet 50 mg, 50 mg, Oral, TID PRN  ibuprofen (ADVIL;MOTRIN) tablet 400 mg, 400 mg, Oral, Q6H PRN  traZODone (DESYREL) tablet 50 mg, 50 mg, Oral, Nightly PRN  polyethylene glycol (GLYCOLAX) packet 17 g, 17 g, Oral, Daily PRN  nicotine polacrilex (NICORETTE) gum 2 mg, 2 mg, Oral, PRN    ASSESSMENT  Depression with suicidal ideation         PLAN  Patient symptoms are: Modestly Improving   Titrate fluoxetine 20 mg daily starting tomorrow  Monitor need and frequency of PRN medications. Encourage participation in groups and milieu. Attempt to develop insight. Psycho-education conducted. Supportive Therapy conducted. Probable discharge is to be determined by MD.   Follow-up daily while inpatient. Patient continues to be monitored in the inpatient psychiatric facility at [facility] for safety and stabilization. Patient continues to need, on a daily basis, active treatment furnished directly by or requiring the supervision of inpatient psychiatric personnel. Electronically signed by ERIC Alcantar CNP on 6/13/2021 at 5:06 PM    **This report has been created using voice recognition software. It may contain minor errors which are inherent in voice recognition technology. **

## 2021-06-14 PROCEDURE — 6370000000 HC RX 637 (ALT 250 FOR IP): Performed by: PSYCHIATRY & NEUROLOGY

## 2021-06-14 PROCEDURE — 1240000000 HC EMOTIONAL WELLNESS R&B

## 2021-06-14 PROCEDURE — 99232 SBSQ HOSP IP/OBS MODERATE 35: CPT | Performed by: PSYCHIATRY & NEUROLOGY

## 2021-06-14 PROCEDURE — APPSS30 APP SPLIT SHARED TIME 16-30 MINUTES: Performed by: PSYCHIATRY & NEUROLOGY

## 2021-06-14 RX ADMIN — FLUOXETINE HYDROCHLORIDE 20 MG: 20 CAPSULE ORAL at 08:34

## 2021-06-14 RX ADMIN — TRAZODONE HYDROCHLORIDE 50 MG: 50 TABLET ORAL at 21:01

## 2021-06-14 RX ADMIN — HYDROXYZINE HYDROCHLORIDE 50 MG: 50 TABLET, FILM COATED ORAL at 21:01

## 2021-06-14 NOTE — PLAN OF CARE
Problem: Gas Exchange - Impaired  Goal: Absence of hypoxia  6/13/2021 2152 by Stephanie Varela RN  Outcome: Ongoing     Problem: Body Temperature -  Risk of, Imbalanced  Goal: Ability to maintain a body temperature within defined limits  6/13/2021 2152 by Stephanie Varela RN  Outcome: Ongoing     Problem: Isolation Precautions - Risk of Spread of Infection  Goal: Prevent transmission of infection  6/13/2021 2152 by Stephanie Varela RN  Outcome: Ongoing   PAtients vitals signs within normal limits, no reports of difficulty breathing or discomfort. Problem: Altered Mood, Depressive Behavior:  Goal: Absence of self-harm  Description: Absence of self-harm  6/13/2021 2152 by Stephanie Varela RN  Outcome: Ongoing     Problem: Altered Mood, Depressive Behavior:  Goal: Ability to disclose and discuss suicidal ideas will improve  Description: Ability to disclose and discuss suicidal ideas will improve  6/13/2021 2152 by Stephanie Varela RN  Outcome: Ongoing     Problem: Altered Mood, Depressive Behavior:  Goal: Able to verbalize and/or display a decrease in depressive symptoms  Description: Able to verbalize and/or display a decrease in depressive symptoms  6/13/2021 2152 by Stephanie Varela RN  Outcome: Ongoing   Patient denies suicidal homicidal ideation this shift and denies hallucinations, patient displays an overly brightened affect but becomes upset when a peer is talking and patient believe that the peer is directing insults at him. Patient remains in room most of shift, verbalizes desire to call mom but did not come out to do so due to the other peer being in the day area. No unsafe behaviors, patient was agitated but then able to redirect and calm self without need for emergency medication, displays improved insight. Patient is medication complaint and remains in behavior control at this time.

## 2021-06-14 NOTE — PROGRESS NOTES
Daily Progress Note  6/14/2021    Patient Name: Lucien Mott:  Depression with suicidal ideation            SUBJECTIVE:      Patient is seen today for a follow up assessment. Patient has been medication compliant. Patient remains on the COVID positive unit where he continues to denies side effects related to his test results and confirms that his mother and sisters were all positive in the home last month. Arleen Lozada is more forward thinking today and shares that he is looking forward to returning home as he misses his family. He reports they live in a rental house and have no financial concerns. He continues to focus on possibility of gaining some independence once his mother establishes a group home environment. He reports improved suicidal ideation and denies desire to self-harm. He is able to contract for safety on unit. He reports his mood as \"good \"and denies side PACs related to titration of Prozac this morning. He did utilize trazodone and reports with good effect although he did experience bizarre nightmares about a meet slaughterhouse. He remains very childlike and is curious if it is true that people receive a chip in their arm when they receive the COVID vaccination and if zombies really will rule the world 1 day. He confirms that this information has been gained on tick talk and is not insulted by differing opinions. At this time patient denies having questions or concerns related to plan of care.     Appetite:  [x] Normal/Adequate/Unchanged  [] Increased  [] Decreased   Sleep:       [x] Normal/Adequate/Unchanged  [] Fair  [] Poor      Group Attendance on Unit:   [x] Yes  [] Selectively    [] No     Medication Side Effects: Denies         Mental Status Exam  Level of consciousness: Awake and alert  Appearance:  Appropriate attire, seated on bed, fair grooming   Behavior/Motor: Childlike, calm, no psychomotor abnormalities noted  Attitude toward examiner:  Cooperative, attentive, good eye contact  Speech: Normal rate, volume, and tone. Mood:  Patient reports \"good  Affect: Mood congruent  Thought processes: Marine Boehringer, goal oriented  Thought content:  Endorses improving suicidal ideations, without current plan or intent, contracts for safety on the unit.               Denies homicidal ideations               Denies hallucinations              Denies delusions              Denies paranoia  Cognition:  Oriented to self, location, time, situation  Concentration: Clinically adequate  Memory: Intact  Insight &Judgment: Improving    Data   height is 5' 10\" (1.778 m) and weight is 410 lb (186 kg) (abnormal). His oral temperature is 97.8 °F (36.6 °C). His blood pressure is 139/82 and his pulse is 94. His respiration is 16 and oxygen saturation is 97%. Labs:   No visits with results within 2 Day(s) from this visit.    Latest known visit with results is:   Hospital Outpatient Visit on 10/07/2020   Component Date Value Ref Range Status    Glucose 10/07/2020 81  70 - 99 mg/dL Final    BUN 10/07/2020 19  6 - 20 mg/dL Final    CREATININE 10/07/2020 0.84  0.70 - 1.20 mg/dL Final    Bun/Cre Ratio 10/07/2020 NOT REPORTED  9 - 20 Final    Calcium 10/07/2020 9.3  8.6 - 10.4 mg/dL Final    Sodium 10/07/2020 142  135 - 144 mmol/L Final    Potassium 10/07/2020 4.4  3.7 - 5.3 mmol/L Final    Chloride 10/07/2020 105  98 - 107 mmol/L Final    CO2 10/07/2020 21  20 - 31 mmol/L Final    Anion Gap 10/07/2020 16  9 - 17 mmol/L Final    Alkaline Phosphatase 10/07/2020 112  40 - 129 U/L Final    ALT 10/07/2020 81* 5 - 41 U/L Final    AST 10/07/2020 45* <40 U/L Final    Total Bilirubin 10/07/2020 0.25* 0.3 - 1.2 mg/dL Final    Total Protein 10/07/2020 7.2  6.4 - 8.3 g/dL Final    Albumin 10/07/2020 4.2  3.5 - 5.2 g/dL Final    Albumin/Globulin Ratio 10/07/2020 1.4  1.0 - 2.5 Final    GFR Non- 10/07/2020 >60  >60 mL/min Final    GFR  10/07/2020 >60  >60 mL/min Final    GFR Comment 10/07/2020        Final    Comment: Average GFR for 20-28 years old:   80 mL/min/1.73sq m  Chronic Kidney Disease:   <60 mL/min/1.73sq m  Kidney failure:   <15 mL/min/1.73sq m              eGFR calculated using average adult body mass. Additional eGFR calculator available at:        [employer]            GFR Staging 10/07/2020 NOT REPORTED   Final    Color, UA 10/07/2020 DARK YELLOW* YELLOW Final    Turbidity UA 10/07/2020 TURBID* CLEAR Final    Glucose, Ur 10/07/2020 NEGATIVE  NEGATIVE Final    Bilirubin Urine 10/07/2020 NEGATIVE  NEGATIVE Final    Ketones, Urine 10/07/2020 NEGATIVE  NEGATIVE Final    Specific [place], UA 10/07/2020 1.035* 1.005 - 1.030 Final    Urine Hgb 10/07/2020 NEGATIVE  NEGATIVE Final    pH, UA 10/07/2020 6.0  5.0 - 8.0 Final    Protein, UA 10/07/2020 1+* NEGATIVE Final    Urobilinogen, Urine 10/07/2020 Normal  Normal Final    Nitrite, Urine 10/07/2020 NEGATIVE  NEGATIVE Final    Leukocyte Esterase, Urine 10/07/2020 NEGATIVE  NEGATIVE Final    Urinalysis Comments 10/07/2020 NOT REPORTED   Final    - 10/07/2020        Final    WBC, UA 10/07/2020 0 TO 2  0 - 5 /HPF Final    RBC, UA 10/07/2020 None  0 - 4 /HPF Final    Reference range defined for non-centrifuged specimen.  Casts UA 10/07/2020 0 TO 2 HYALINE Reference range defined for non-centrifuged specimen. 0 - 8 /LPF Final    Crystals, UA 10/07/2020 NOT REPORTED  None /HPF Final    Epithelial Cells UA 10/07/2020 0 TO 2  0 - 5 /HPF Final    Renal Epithelial, UA 10/07/2020 NOT REPORTED  0 /HPF Final    Bacteria, UA 10/07/2020 NOT REPORTED  None Final    Mucus, UA 10/07/2020 NOT REPORTED  None Final    Trichomonas, UA 10/07/2020 NOT REPORTED  None Final    Amorphous, UA 10/07/2020 NOT REPORTED  None Final    Other Observations UA 10/07/2020 NOT REPORTED  NOT REQ.  Final    Yeast, UA 10/07/2020 NOT REPORTED  None Final         Reviewed patient's current plan of care and vital signs with nursing staff. Labs reviewed: [x] Yes  Last EKG in EMR reviewed: [] Yes    Medications  Current Facility-Administered Medications: FLUoxetine (PROZAC) capsule 20 mg, 20 mg, Oral, Daily  acetaminophen (TYLENOL) tablet 650 mg, 650 mg, Oral, Q4H PRN  aluminum & magnesium hydroxide-simethicone (MAALOX) 200-200-20 MG/5ML suspension 30 mL, 30 mL, Oral, Q6H PRN  hydrOXYzine (ATARAX) tablet 50 mg, 50 mg, Oral, TID PRN  ibuprofen (ADVIL;MOTRIN) tablet 400 mg, 400 mg, Oral, Q6H PRN  traZODone (DESYREL) tablet 50 mg, 50 mg, Oral, Nightly PRN  polyethylene glycol (GLYCOLAX) packet 17 g, 17 g, Oral, Daily PRN  nicotine polacrilex (NICORETTE) gum 2 mg, 2 mg, Oral, PRN    ASSESSMENT  Depression with suicidal ideation         PLAN  Patient symptoms are: Showing improvement  Continue with current medication regimen as patient has tolerated titration of fluoxetine  Monitor need and frequency of PRN medications. Encourage participation in groups and milieu. Attempt to develop insight. Psycho-education conducted. Supportive Therapy conducted. Probable discharge is to be determined by MD.   Follow-up daily while inpatient. Patient continues to be monitored in the inpatient psychiatric facility at [facility] for safety and stabilization. Patient continues to need, on a daily basis, active treatment furnished directly by or requiring the supervision of inpatient psychiatric personnel. Electronically signed by ERIC Florez CNP on 6/14/2021 at 3:23 PM    **This report has been created using voice recognition software. It may contain minor errors which are inherent in voice recognition technology. **                                         Psychiatry Attending Attestation     I independently saw and evaluated the patient. I reviewed the Advance Practice Provider's documentation above.   Any additional comments or changes to the Advance Practice Provider's documentation are stated below otherwise agree with assessment. Discussed with him at length about the stressors that led to his admission. Patient mentions that he was involved with a judy in an online relationship and he has been sending him out sexually explicit pictures. Reports he was also making video calls. Mentions that the judy has been harassing him after his mother asked him to stay away from that person. Reports that he also tried to block his number however he keeps getting calls from different numbers. Reports that the stress remote. Mentions that he will find a way with his mother to avoid this person. Reports that his suicidal thoughts are improving. Possible discharge is Wednesday morning if he continues to improve.     Electronically signed by Mumtaz Wang MD on 6/14/21 at 3:46 PM EDT

## 2021-06-14 NOTE — BH NOTE
1:1 Note:    Patient was approached for a one to one therapeutic interaction which patient declined initially, patient requested a journal to keep in his room. Patient expressed his interest in music as a coping mechanism. Patient sat with recreation therapy and completed a word search for 30 minutes while engaged in conversation.

## 2021-06-14 NOTE — PLAN OF CARE
Problem: Altered Mood, Depressive Behavior:  Goal: Able to verbalize and/or display a decrease in depressive symptoms  Description: Able to verbalize and/or display a decrease in depressive symptoms  Outcome: Ongoing  Note: Patient denies depression and anxiety at this time. He states that he is feeling better. Patient is eating and sleeping well. Patient reports that he is staying in his room due to another patient bothering him. Patient has not had any anger outbursts thus far, this shift. Goal: Ability to disclose and discuss suicidal ideas will improve  Description: Ability to disclose and discuss suicidal ideas will improve  Outcome: Ongoing  Note: Patient denies thoughts of harm to self or others. Visual safety checks are completed every 15 minutes and randomly. Goal: Absence of self-harm  Description: Absence of self-harm  Outcome: Ongoing  Note: There have been no incidents of self harm observed or reported. Problem: Gas Exchange - Impaired  Goal: Absence of hypoxia  Outcome: Ongoing  Note: Oxygenation is within normal limits. Problem: Body Temperature -  Risk of, Imbalanced  Goal: Ability to maintain a body temperature within defined limits  Outcome: Ongoing  Note: Body temperature is within normal limits. Problem: Isolation Precautions - Risk of Spread of Infection  Goal: Prevent transmission of infection  Outcome: Ongoing  Note: Isolation precautions are maintained as ordered. Problem: Pain:  Goal: Pain level will decrease  Description: Pain level will decrease  Outcome: Ongoing  Note: Patient denies pain at this time.

## 2021-06-15 PROCEDURE — 6370000000 HC RX 637 (ALT 250 FOR IP): Performed by: PSYCHIATRY & NEUROLOGY

## 2021-06-15 PROCEDURE — 99232 SBSQ HOSP IP/OBS MODERATE 35: CPT | Performed by: PSYCHIATRY & NEUROLOGY

## 2021-06-15 PROCEDURE — 1240000000 HC EMOTIONAL WELLNESS R&B

## 2021-06-15 PROCEDURE — APPSS30 APP SPLIT SHARED TIME 16-30 MINUTES: Performed by: PSYCHIATRY & NEUROLOGY

## 2021-06-15 RX ADMIN — FLUOXETINE HYDROCHLORIDE 20 MG: 20 CAPSULE ORAL at 07:49

## 2021-06-15 RX ADMIN — ACETAMINOPHEN 650 MG: 325 TABLET, FILM COATED ORAL at 21:33

## 2021-06-15 RX ADMIN — TRAZODONE HYDROCHLORIDE 50 MG: 50 TABLET ORAL at 20:29

## 2021-06-15 RX ADMIN — HYDROXYZINE HYDROCHLORIDE 50 MG: 50 TABLET, FILM COATED ORAL at 20:29

## 2021-06-15 ASSESSMENT — PAIN DESCRIPTION - PAIN TYPE: TYPE: ACUTE PAIN

## 2021-06-15 ASSESSMENT — PAIN SCALES - GENERAL
PAINLEVEL_OUTOF10: 2
PAINLEVEL_OUTOF10: 0
PAINLEVEL_OUTOF10: 0

## 2021-06-15 ASSESSMENT — PAIN DESCRIPTION - ONSET: ONSET: SUDDEN

## 2021-06-15 ASSESSMENT — PAIN - FUNCTIONAL ASSESSMENT: PAIN_FUNCTIONAL_ASSESSMENT: ACTIVITIES ARE NOT PREVENTED

## 2021-06-15 ASSESSMENT — PAIN DESCRIPTION - LOCATION: LOCATION: HEAD

## 2021-06-15 NOTE — GROUP NOTE
Group Therapy Note    Date: 6/15/2021    Group Start Time: 1445  Group End Time: 2309  Group Topic: Recreational    KENNETH BOLANOS    Ezequiel David        Group Therapy Note         Patient's Goal:  Increase leisure oportunities, provide cognitive stimulation, increase socialziation    Notes:  Patient engaged 1:1 with this writer, learning new card games. Able to learn and play appropriately with some suport from this writer in the games. Throughout playing, talked about music and music interests, asking if this writer knew some of the bands he liked, and talking about their recent music. Was often bright throughout    Status After Intervention:  Improved    Participation Level:  Active Listener and Interactive    Participation Quality: Appropriate, Attentive and Sharing      Speech:  normal      Thought Process/Content: Logical  Linear      Affective Functioning: Congruent      Mood: euthymic      Level of consciousness:  Alert and Attentive      Response to Learning: Able to verbalize current knowledge/experience, Able to verbalize/acknowledge new learning and Progressing to goal      Endings: None Reported    Modes of Intervention: Socialization, Exploration, Activity and Reality-testing      Discipline Responsible: Psychoeducational Specialist      Signature:  Ezequiel David

## 2021-06-15 NOTE — PLAN OF CARE
Problem: Altered Mood, Depressive Behavior:  Goal: Able to verbalize and/or display a decrease in depressive symptoms  Description: Able to verbalize and/or display a decrease in depressive symptoms  6/14/2021 2122 by Christin Martin LPN  Outcome: Ongoing  Note: Patient denies depression at this time. Patient educated and agrees to come to staff if this occurs. Patient denies suicidal/homicidal ideations at this time. Patient denies hallucinations at this time. Patient monitored every 15 minutes with environmental safety checks. Problem: Altered Mood, Depressive Behavior:  Goal: Ability to disclose and discuss suicidal ideas will improve  Description: Ability to disclose and discuss suicidal ideas will improve  6/14/2021 2122 by Christin Martin LPN  Outcome: Ongoing  Note: Patient denies suicidal ideations at this time. Patient educated and agrees to come to staff if this occurs. Patient monitored every 15 minutes with environmental safety checks. Problem: Altered Mood, Depressive Behavior:  Goal: Absence of self-harm  Description: Absence of self-harm  6/14/2021 2122 by Christin Martin LPN  Outcome: Ongoing  Note: Patient denies thoughts of self harm at this time. Patient is out brightened and social with staff during shift. Patient is absent of self harm at this time. Patient monitored every 15 minutes with environmental safety checks. Problem: Gas Exchange - Impaired  Goal: Absence of hypoxia  6/14/2021 2122 by Chritsin Martin LPN  Outcome: Ongoing  Note: Patient is out in dayroom breathing even and unlabored at this time. Patients SPO2 is 97 at this time. Patient is correct color for ethnicity. Patient educated and agrees to alert staff if difficulty breathing occurs. Patient monitored every 15 minutes with environmental safety checks. Problem:  Body Temperature -  Risk of, Imbalanced  Goal: Ability to maintain a body temperature within defined limits  6/14/2021 2122 by Christin Martin LPN  Outcome: Ongoing Note: Patients temperature is within defined limits at this time. Patient educated and agrees to come to staff is this occurs. Patient monitored every 15 minutes with environmental safety checks. Problem: Isolation Precautions - Risk of Spread of Infection  Goal: Prevent transmission of infection  6/14/2021 2122 by Ramsey Gale LPN  Outcome: Ongoing  Note: Isolation precautions maintained as ordered. Patient wears mask out in dayroom during shift. Patient monitored every 15 minutes with environmental safety checks. Problem: Pain:  Goal: Pain level will decrease  Description: Pain level will decrease  6/14/2021 2122 by Ramsey Gale LPN  Outcome: Ongoing  Note: Patient denies pain at this time. Patient educated and agrees to come to staff if pain occurs. Patient monitored every 15 minutes with environmental safety checks.

## 2021-06-15 NOTE — PLAN OF CARE
Problem: Altered Mood, Depressive Behavior:  Goal: Able to verbalize and/or display a decrease in depressive symptoms  Description: Able to verbalize and/or display a decrease in depressive symptoms  Outcome: Ongoing  Note: Patient denies depression and anxiety symptoms. He is isolative to his room. He is eating and sleeping well. Patient is compliant with medication. Patient has been refusing to shower. Goal: Ability to disclose and discuss suicidal ideas will improve  Description: Ability to disclose and discuss suicidal ideas will improve  Outcome: Ongoing  Note: Patient denies thoughts of harm to self or others. Visual safety checks are completed every 15 minutes and randomly. Goal: Absence of self-harm  Description: Absence of self-harm  Outcome: Ongoing  Note: No incidents of self harm observed or reported. Problem: Gas Exchange - Impaired  Goal: Absence of hypoxia  Outcome: Ongoing  Note: Patient's oxygenation is within normal limits. Problem: Body Temperature -  Risk of, Imbalanced  Goal: Ability to maintain a body temperature within defined limits  Outcome: Ongoing  Note: Patient's body temperature is within normal limits. Problem: Isolation Precautions - Risk of Spread of Infection  Goal: Prevent transmission of infection  Outcome: Ongoing  Note: Isolation precautions are maintained as ordered.       Problem: Pain:  Goal: Pain level will decrease  Description: Pain level will decrease  Outcome: Ongoing

## 2021-06-15 NOTE — PROGRESS NOTES
Daily Progress Note  6/15/2021    Patient Name: Flako Osborn:  Depression with suicidal ideation            SUBJECTIVE:      Patient is seen today for a follow up assessment. Patient has been medication compliant. Patient remains on the COVID positive unit where he continues to isolate to his room. He denies having little interest in participating in milieu programming or taking active role in his ADLs. He has been encouraged to shower and wash his soiled close however he minimizes the importance of doing so. He reports improving suicidal ideation and contracts for safety on unit. He denies side effects related to the increase dosage of Prozac. He reports that he experienced a another bizarre dream last evening related to [facility] the doll killer \"and denies this is disturbing. He reports adequate sleep and appetite and denies having questions or concerns related to his current plan of care. Appetite:  [x] Normal/Adequate/Unchanged  [] Increased  [] Decreased   Sleep:       [x] Normal/Adequate/Unchanged  [] Fair  [] Poor      Group Attendance on Unit:   [x] Yes  [] Selectively    [] No     Medication Side Effects: Denies         Mental Status Exam  Level of consciousness: Awake and alert  Appearance:  Appropriate attire, seated on bed, poor grooming   Behavior/Motor: Childlike, calm, no psychomotor abnormalities noted  Attitude toward examiner:  Cooperative, attentive, fair eye contact  Speech: Normal rate, volume, and tone.   Mood:  Patient reports \"I do not know \"  Affect: Blunted  Thought processes: Harshad Aguillon, goal oriented  Thought content:  Endorses improving suicidal ideations, without current plan or intent, contracts for safety on the unit.               Denies homicidal ideations               Denies hallucinations              Denies delusions              Denies paranoia  Cognition:  Oriented to self, location, time, situation  Concentration: Clinically adequate  Memory: Intact Insight &Judgment: Improving    Data   height is 5' 10\" (1.778 m) and weight is 410 lb (186 kg) (abnormal). His oral temperature is 98.1 °F (36.7 °C). His blood pressure is 126/74 and his pulse is 76. His respiration is 15 and oxygen saturation is 98%. Labs:   No visits with results within 2 Day(s) from this visit. Latest known visit with results is:   Hospital Outpatient Visit on 10/07/2020   Component Date Value Ref Range Status    Glucose 10/07/2020 81  70 - 99 mg/dL Final    BUN 10/07/2020 19  6 - 20 mg/dL Final    CREATININE 10/07/2020 0.84  0.70 - 1.20 mg/dL Final    Bun/Cre Ratio 10/07/2020 NOT REPORTED  9 - 20 Final    Calcium 10/07/2020 9.3  8.6 - 10.4 mg/dL Final    Sodium 10/07/2020 142  135 - 144 mmol/L Final    Potassium 10/07/2020 4.4  3.7 - 5.3 mmol/L Final    Chloride 10/07/2020 105  98 - 107 mmol/L Final    CO2 10/07/2020 21  20 - 31 mmol/L Final    Anion Gap 10/07/2020 16  9 - 17 mmol/L Final    Alkaline Phosphatase 10/07/2020 112  40 - 129 U/L Final    ALT 10/07/2020 81* 5 - 41 U/L Final    AST 10/07/2020 45* <40 U/L Final    Total Bilirubin 10/07/2020 0.25* 0.3 - 1.2 mg/dL Final    Total Protein 10/07/2020 7.2  6.4 - 8.3 g/dL Final    Albumin 10/07/2020 4.2  3.5 - 5.2 g/dL Final    Albumin/Globulin Ratio 10/07/2020 1.4  1.0 - 2.5 Final    GFR Non- 10/07/2020 >60  >60 mL/min Final    GFR  10/07/2020 >60  >60 mL/min Final    GFR Comment 10/07/2020        Final    Comment: Average GFR for 20-28 years old:   80 mL/min/1.73sq m  Chronic Kidney Disease:   <60 mL/min/1.73sq m  Kidney failure:   <15 mL/min/1.73sq m              eGFR calculated using average adult body mass.  Additional eGFR calculator available at:        [employer]            GFR Staging 10/07/2020 NOT REPORTED   Final    Color, UA 10/07/2020 DARK YELLOW* YELLOW Final    Turbidity UA 10/07/2020 TURBID* CLEAR Final    Glucose, Ur 10/07/2020 responding to current treatment. Discharge soon, if patient continues to show improvement. Case discussed with the staff.        Electronically signed by Lester Barron MD on 6/15/21 at 9:26 PM EDT

## 2021-06-16 VITALS
HEART RATE: 85 BPM | DIASTOLIC BLOOD PRESSURE: 86 MMHG | HEIGHT: 70 IN | SYSTOLIC BLOOD PRESSURE: 133 MMHG | BODY MASS INDEX: 45.1 KG/M2 | TEMPERATURE: 97.5 F | OXYGEN SATURATION: 100 % | WEIGHT: 315 LBS | RESPIRATION RATE: 14 BRPM

## 2021-06-16 PROCEDURE — 6370000000 HC RX 637 (ALT 250 FOR IP): Performed by: PSYCHIATRY & NEUROLOGY

## 2021-06-16 PROCEDURE — 99239 HOSP IP/OBS DSCHRG MGMT >30: CPT | Performed by: PSYCHIATRY & NEUROLOGY

## 2021-06-16 RX ORDER — HYDROXYZINE 50 MG/1
50 TABLET, FILM COATED ORAL 3 TIMES DAILY PRN
Qty: 30 TABLET | Refills: 0 | Status: SHIPPED | OUTPATIENT
Start: 2021-06-16 | End: 2021-06-26

## 2021-06-16 RX ORDER — FLUOXETINE HYDROCHLORIDE 20 MG/1
20 CAPSULE ORAL DAILY
Qty: 30 CAPSULE | Refills: 0 | Status: SHIPPED | OUTPATIENT
Start: 2021-06-17

## 2021-06-16 RX ORDER — TRAZODONE HYDROCHLORIDE 50 MG/1
50 TABLET ORAL NIGHTLY PRN
Qty: 30 TABLET | Refills: 0 | Status: SHIPPED | OUTPATIENT
Start: 2021-06-16

## 2021-06-16 RX ADMIN — FLUOXETINE HYDROCHLORIDE 20 MG: 20 CAPSULE ORAL at 08:14

## 2021-06-16 NOTE — DISCHARGE SUMMARY
Provider Discharge Summary     Patient ID:  Darrell Newman  [id number]  73 y.o.  2000    Admit date: 6/10/2021    Discharge date and time: 6/16/2021  5:16 PM     Admitting Physician: Leora Prather MD     Discharge Physician: Leora Prather MD    Admission Diagnoses: Major depression, single episode [F32.9]    Discharge Diagnoses:      Depression with suicidal ideation     Patient Active Problem List   Diagnosis Code    ADHD (attention deficit hyperactivity disorder) F90.9    Obesity E66.9    MR (mental retardation) F79    Depression with suicidal ideation F32.9, R45.851        Admission Condition: poor    Discharged Condition: stable    Indication for Admission: threat to self    History of Present Illnes (present tense wording is of findings from admission exam and are not necessarily indicative of current findings):   Darrell Newman is a 23 y.o. male who has no significant medical history. Patient presented to the ED via police at [facility] with a complaint of suicidal ideation. The patient was picked up at his home where he was found sitting outside yelling about killing himself. It is reported that he has been off of his psychiatric medications as he does not remember to take them. ER notes stated that his mother's boyfriend believes him and caused him that, so this is why he is suicidal.  His plan was to overdose on sleeping pills, which she does have access to as they were his sisters. It was found that he has 10 pills saved already. The patient stated today that he has suicidal ideations with a plan to overdose on sleeping pills. He did not specify the name, but again did say he had access to them. The patient would not discuss the reason why he was feeling suicidal, but did state there was an event on Tuesday.   There was also an event where his mother made him break-up with his boyfriend, but stated this is not specifically why he is suicidal.  His boyfriend was 48, and his mother did not like that. He does state that his mother does not mind that he is homosexual.  He reported there were no issues in the relationship.     The patient reported depressive symptoms of feeling guilty and worthless, poor concentration, change in appetite, feeling hopeless and helpless, suicidal ideation with plan. He denies any history of edelmira. He denies any hallucinations, delusions, self reference, or symptoms of psychosis. He reports that he has panic attacks, with the most recent being 2 weeks ago. Panic symptoms include trembling, nausea, and chills. He reports generalized anxiety symptoms of excess worry, fatigue, poor concentration. He denies any phobias. He denies any eating disorders. He denies any intrusive persistent thoughts and OCD tendencies. He denies any traumatic events and PTSD symptoms. He does have a history of ODD as a child. Other personality disorder traits include fear of abandonment and rejection, poor self-esteem, intense anger and outbursts, labile mood and impulsivity. The patient previously used to burn himself. He currently cuts himself on his right wrist as he is left-handed. Hospital Course:   Upon admission, Abigail Marie was provided a safe secure environment, introduced to unit milieu. Patient participated in groups and individual therapies. Meds were adjusted as noted below. After few days of hospital care, patient began to feel improvement. Depression lifted, thoughts to harm self ceased. Sleep improved, appetite was good. On morning rounds 6/16/2021, Abigail Marie endorses feeling ready for discharge. Patient denies suicidal or homicidal ideations, denies hallucinations or delusions. Denies SE's from meds. It was decided that maximum benefit from hospital care had been achieved and patient can be discharged.      Consults:   none    Significant Diagnostic Studies: Routine labs and diagnostics    Treatments: Psychotropic medications, therapy with group, Follow-up as scheduled with [facility]       Signed:    Electronically signed by Clinton De Jesus MD on 6/16/21 at 5:16 PM EDT    Time Spent on discharge is more than 35 minutes in the examination, evaluation, counseling and review of medications and discharge plan.

## 2021-06-16 NOTE — BH NOTE
Patient given tobacco quitline number 83721539243 at this time, refusing to call at this time, states \" I just dont want to quit now\"- patient given information as to the dangers of long term tobacco use. Continue to reinforce the importance of tobacco cessation.

## 2021-06-16 NOTE — PLAN OF CARE
Problem: Altered Mood, Depressive Behavior:  Goal: Able to verbalize and/or display a decrease in depressive symptoms  Description: Able to verbalize and/or display a decrease in depressive symptoms  6/15/2021 2251 by Norman Adames RN  Outcome: Ongoing     Problem: Altered Mood, Depressive Behavior:  Goal: Ability to disclose and discuss suicidal ideas will improve  Description: Ability to disclose and discuss suicidal ideas will improve  6/15/2021 2251 by Norman Adames RN  Outcome: Ongoing     Problem: Altered Mood, Depressive Behavior:  Goal: Absence of self-harm  Description: Absence of self-harm  6/15/2021 2251 by Norman Adames RN  Outcome: Ongoing     Patient denies suicidal ideations, homicidal ideations and denies auditory/visual hallucinations. Patient denies feelings of depression but admits to feeling some anxiety. Patient remains free from self harm and agrees to speak with staff if thoughts of self harm arise. Patient reports being discharged tomorrow and is looking forward to it but states he has not plans after he discharged. Patient encouraged to continue using positive coping skills outside of hospital, take medication as prescribed, and follow up with providers. Q 15 minute checks maintained for patient safety.

## 2021-06-16 NOTE — BH NOTE
[facility]  Discharge Note    Pt discharged with followings belongings:   Dentures: None  Vision - Corrective Lenses: None  Hearing Aid: None  Jewelry: Watch  Body Piercings Removed: N/A  Clothing: Footwear, Pants, Undergarments (Comment), Socks  Were All Patient Medications Collected?: Not Applicable  Other Valuables: Cell phone   Valuables sent home with Patient . Valuables retrieved from safe, Security envelope number:  [id number] and returned to patient. Patient education on aftercare instructions: Discharge  Information faxed to Zuly Green by staff Patient verbalize understanding of AVS:  yes.     Status EXAM upon discharge:  Status and Exam  Normal: Yes  Facial Expression: Brightened  Affect: Appropriate, Congruent  Level of Consciousness: Alert  Mood:Normal: Yes  Mood: Anxious, Helpless  Motor Activity:Normal: Yes  Motor Activity: Decreased  Interview Behavior: Cooperative  Preception: [place] to Person, Vernadine Geoffrey to Time, [place] to Place, [place] to Situation  Attention:Normal: No  Attention: Distractible  Thought Processes: Circumstantial  Thought Content:Normal: No  Thought Content: Preoccupations  Hallucinations: None  Delusions: No  Memory:Normal: No  Memory: Poor Recent  Insight and Judgment: No  Insight and Judgment: Poor Insight, Poor Judgment  Present Suicidal Ideation: No  Present Homicidal Ideation: No  Patient stable discharge to home via cab    Metabolic Screening:    Lab Results   Component Value Date    LABA1C 4.8 02/22/2019       Lab Results   Component Value Date    CHOL 124 02/22/2019    CHOL 122 04/12/2014     Lab Results   Component Value Date    TRIG 159 (H) 02/22/2019    TRIG 96 04/12/2014     Lab Results   Component Value Date    HDL 36 (L) 08/28/2019    HDL 26 (L) 02/22/2019    HDL 40 (L) 04/12/2014     No components found for: LDLCAL  No results found for: Jenn Way LPN

## 2021-06-16 NOTE — SUICIDE SAFETY PLAN
nd SAFETY PLAN    A suicide Safety Plan is a document that supports someone when they are having thoughts of suicide. Warning Signs that indicate a suicidal crisis may be developing: What (situations, thoughts, feelings, body sensations, behaviors, etc.) do you experience that lets you know you are beginning to think about suicide? 1. Feeling angry  2. Feeling depressed  3. Feeling anxious and paranoid    Internal Coping Strategies:  What things can I do (relaxation techniques, hobbies, physical activities, etc.) to take my mind off my problems without contacting another person? 1. Listen to music  2. Watch [employer] or [employer]  3. Play games    People and social settings that provide distraction: Who can I call or where can I go to distract me? 1. Name: talk to my friends online    2. Name: call Shelley Hess and talk to my     3. Place: the grocery store           4. Place: the place by the old elementary school    Professionals or [address] [place] [address] I can contact during a crisis: Who can I call for help - for example, my doctor, my psychiatrist, my psychologist, a mental health provider, a suicide hotline? 1. [address] [place] [address] [phone]    2. [facility] 167-204-4424    3. Suicide Prevention Lifeline: 5-467-324-TALK (4882)    4. [facility]  [facility] Hotline: 520  Crisis text hotline: text 06-15179440    Making the environment safe: How can I make my environment (house/apartment/living space) safer? For example, can I remove guns, medications, and other items? 1. Take medications as prescribed  2.  Attend all scheduled appointments

## 2024-04-30 ENCOUNTER — TELEPHONE (OUTPATIENT)
Dept: INTERNAL MEDICINE CLINIC | Age: 24
End: 2024-04-30

## 2024-04-30 NOTE — TELEPHONE ENCOUNTER
Outpatient dietitian callback to patient. Pt left msge needing to cancel and r/s RD appt.  Callback made to patient and he stated he needed our office phone# so the transportation has this information and can bring him to our office.  Pt stated he already had the RD office location and only needed the phone #.  Pt stated he can still come to his appt on Monday, 5/6 @ 3 pm.

## 2024-05-06 ENCOUNTER — OFFICE VISIT (OUTPATIENT)
Dept: INTERNAL MEDICINE CLINIC | Age: 24
End: 2024-05-06
Payer: MEDICARE

## 2024-05-06 DIAGNOSIS — E66.01 CLASS 3 SEVERE OBESITY DUE TO EXCESS CALORIES IN ADULT, UNSPECIFIED BMI, UNSPECIFIED WHETHER SERIOUS COMORBIDITY PRESENT (HCC): Primary | ICD-10-CM

## 2024-05-06 PROCEDURE — 97802 MEDICAL NUTRITION INDIV IN: CPT | Performed by: DIETITIAN, REGISTERED

## 2024-05-06 NOTE — PROGRESS NOTES
[facility]  [facility]  [address] [facility] [place] [address]  [phone] (phone)  [phone] (fax)    Patient Name: Homero Shannon. Date of Birth: [id number]. MRN: [id number]      Assessment: Patient is a 23 y.o. male seen for Initial MNT visit for Obesity.     -Nutritionally relevant labs:   Lab Results   Component Value Date/Time    LABA1C 5.6 03/20/2024 03:30 PM    LABA1C 4.8 02/22/2019 08:30 AM    GLUCOSE 81 10/07/2020 09:00 AM    GLUCOSE 86 08/28/2019 02:15 PM    CHOL 124 02/22/2019 08:30 AM    HDL 36 (L) 08/28/2019 02:15 PM    TRIG 159 (H) 02/22/2019 08:30 AM     -Lifestyle: does not work, not going to school, pt states not overly active, watches TV, and some video games, pt admits he gets bord a lot.     -Food recall:   Breakfast: skips.   Lunch: frozen pizza. Pizza rolls, frozen dinners.   Dinner: frozen pizza, pizza rolls, frozen dinners.   Snacks: smoothies    -Main Beverages: SF crystal light   -Impression of Dietary Intake: in general, an \"unhealthy\" diet    -  Current Outpatient Medications on File Prior to Visit   Medication Sig Dispense Refill    FLUoxetine (PROZAC) 20 MG capsule Take 1 capsule by mouth daily 30 capsule 0    traZODone (DESYREL) 50 MG tablet Take 1 tablet by mouth nightly as needed for Sleep 30 tablet 0     No current facility-administered medications on file prior to visit.        - Patient states he started gaining wt around 14 years old. Pt states was over wt b/f age 10 but once he moved to [facility] home and exercising and lost wt during that state. 11-15 yr old stable. At age 15 started wt.   - Does see therapist   - Exercise: none. Pt states he has been SOB. Encouraged him to call MD.   - Lives on his own, grocery shopping, cooks    Food Recall   - Vegetables he likes but admits he does not prepare alot: carrots, salad, broccoli, green beans, cauliflower, mushroom, tomatoes   - Breakfast: none  - Lunch: frozen TV dinners, pizza  -

## 2024-05-06 NOTE — PATIENT INSTRUCTIONS
in[employer]ar[employer] (How to Make a Healthy Smoothie + Easy and Balanced Smoothie Recipes)    1.) Vegetable: with lunch and dinner and snack if you want    2.) Fruit: 2 a day     3.) Chair Exercises daily (20 min)    4.) Stretching 20 min daily     5.) Limit pizza and pizza rolls to twice a week    5.) Food Records

## 2024-08-19 NOTE — DISCHARGE INSTR - DIET

## 2024-10-10 ENCOUNTER — PATIENT MESSAGE (OUTPATIENT)
Dept: INTERNAL MEDICINE CLINIC | Age: 24
End: 2024-10-10

## 2025-03-03 ENCOUNTER — HOSPITAL ENCOUNTER (EMERGENCY)
Age: 25
Discharge: HOME | End: 2025-03-03
Payer: MEDICARE

## 2025-03-03 VITALS
HEART RATE: 109 BPM | TEMPERATURE: 98.4 F | RESPIRATION RATE: 27 BRPM | SYSTOLIC BLOOD PRESSURE: 147 MMHG | OXYGEN SATURATION: 98 % | DIASTOLIC BLOOD PRESSURE: 99 MMHG

## 2025-03-03 DIAGNOSIS — H65.191 OTHER ACUTE NONSUPPURATIVE OTITIS MEDIA OF RIGHT EAR, RECURRENCE NOT SPECIFIED: Primary | ICD-10-CM

## 2025-03-03 PROCEDURE — 99283 EMERGENCY DEPT VISIT LOW MDM: CPT

## 2025-03-03 RX ORDER — AMOXICILLIN 875 MG/1
875 TABLET, COATED ORAL 2 TIMES DAILY
Qty: 14 TABLET | Refills: 0 | Status: SHIPPED | OUTPATIENT
Start: 2025-03-03 | End: 2025-03-10

## 2025-03-03 RX ORDER — NAPROXEN 500 MG/1
500 TABLET ORAL 2 TIMES DAILY PRN
Qty: 60 TABLET | Refills: 0 | Status: SHIPPED | OUTPATIENT
Start: 2025-03-03

## 2025-03-03 NOTE — ED TRIAGE NOTES
Presents to ED with complaints of being unable to hear out of his right ear. Pt reports this began on Wednesday, but has not had transportation to get it checked out.

## 2025-03-03 NOTE — DISCHARGE INSTRUCTIONS
Follow-up with your PCP in a week's time to monitor for full resolution.  Take the antibiotics as prescribed and the pain medication if needed.    Take your medication as indicated and prescribed.  If you are given an antibiotic, then make sure you get the prescription filled and take the antibiotics until finished.  Drink plenty of water while taking the antibiotics.  Avoid drinking alcohol or drinks that have caffeine in it while taking antibiotics.       For pain use acetaminophen (Tylenol) or ibuprofen (Motrin / Advil), unless prescribed medications that have acetaminophen or ibuprofen (or similar medications) in it.  You can take over the counter acetaminophen tablets (1 - 2 tablets of the 500-mg strength every 6 hours) or ibuprofen tablets (2 tablets every 4 hours).    PLEASE RETURN TO THE EMERGENCY DEPARTMENT IMMEDIATELY for worsening symptoms, feeling of the room spinning, repeated bouts of dizziness, inability to hear, white discharge coming from your ear, or if you develop any concerning symptoms such as: high fever not relieved by acetaminophen (Tylenol) and/or ibuprofen (Motrin / Advil), chills, shortness of breath, chest pain, feeling of your heart fluttering or racing, persistent nausea and/or vomiting, vomiting up blood, blood in your stool, loss of consciousness, numbness, weakness or tingling in the arms or legs or change in color of the extremities, changes in mental status, persistent headache, blurry vision, loss of bladder / bowel control, unable to follow up with your physician, or other any other care or concern.

## 2025-03-03 NOTE — ED PROVIDER NOTES
he thought it was clogged with ear waxed, and he used water to flush his ear as well as OTC cerumen removal drops with no relief. Patient is without any other complaints.  Physical exam revealed  Right Ear: Ear canal and external ear normal. Decreased hearing noted. A middle ear effusion is present. There is no impacted cerumen. No mastoid tenderness. No PE tube. Tympanic membrane is erythematous and bulging. Tympanic membrane is not perforated. The patient was seen and examined. Appropriate diagnostic testing was performed and results reviewed with the patient. The results of pertinent diagnostic studies and exam findings were discussed. The patient’s provisional diagnosis and plan of care were discussed with the patient who expressed understanding. See ed course. Any medications were reviewed and indications and risks of medications were discussed with the patient. Strict verbal and written return precautions, instructions and appropriate follow-up provided to  the patient.     ED Medications administered this visit:  (None if blank)  Medications - No data to display      PROCEDURES: (None if blank)  Procedures:     CRITICAL CARE: (None if blank)      DISCHARGE PRESCRIPTIONS: (None if blank)  New Prescriptions    AMOXICILLIN (AMOXIL) 875 MG TABLET    Take 1 tablet by mouth 2 times daily for 7 days    NAPROXEN (NAPROSYN) 500 MG TABLET    Take 1 tablet by mouth 2 times daily as needed for Pain       FINAL IMPRESSION      1. Other acute nonsuppurative otitis media of right ear, recurrence not specified          DISPOSITION/PLAN   DISPOSITION Decision To Discharge 03/03/2025 11:18:55 AM   DISPOSITION CONDITION Stable           OUTPATIENT FOLLOW UP THE PATIENT:  Perla Saldaña PA-C  [address] [place], [place] [id number]  [phone]    Schedule an appointment as soon as possible for a visit   For follow up      PHILLIP Corona Lanz, PA-C  03/03/25 [id number]

## 2025-09-05 ENCOUNTER — HOSPITAL ENCOUNTER (EMERGENCY)
Age: 25
Discharge: HOME | End: 2025-09-05
Attending: EMERGENCY MEDICINE
Payer: MEDICARE

## 2025-09-05 VITALS
SYSTOLIC BLOOD PRESSURE: 156 MMHG | WEIGHT: 315 LBS | BODY MASS INDEX: 44.1 KG/M2 | RESPIRATION RATE: 26 BRPM | DIASTOLIC BLOOD PRESSURE: 101 MMHG | TEMPERATURE: 98.2 F | HEIGHT: 71 IN | HEART RATE: 98 BPM | OXYGEN SATURATION: 95 %

## 2025-09-05 DIAGNOSIS — H65.92 LEFT NON-SUPPURATIVE OTITIS MEDIA: Primary | ICD-10-CM

## 2025-09-05 LAB
FLUAV RNA RESP QL NAA+PROBE: NOT DETECTED
FLUBV RNA RESP QL NAA+PROBE: NOT DETECTED
SARS-COV-2 RNA RESP QL NAA+PROBE: NOT DETECTED

## 2025-09-05 PROCEDURE — 87636 SARSCOV2 & INF A&B AMP PRB: CPT

## 2025-09-05 RX ORDER — AMOXICILLIN 500 MG/1
500 CAPSULE ORAL 2 TIMES DAILY
Qty: 14 CAPSULE | Refills: 0 | Status: SHIPPED | OUTPATIENT
Start: 2025-09-05 | End: 2025-09-12

## 2025-09-05 ASSESSMENT — PAIN DESCRIPTION - ORIENTATION: ORIENTATION: LEFT

## 2025-09-05 ASSESSMENT — PAIN SCALES - GENERAL: PAINLEVEL_OUTOF10: 5

## 2025-09-05 ASSESSMENT — PAIN DESCRIPTION - LOCATION: LOCATION: EAR

## 2025-09-05 ASSESSMENT — PAIN - FUNCTIONAL ASSESSMENT: PAIN_FUNCTIONAL_ASSESSMENT: 0-10
